# Patient Record
Sex: FEMALE | Race: WHITE | NOT HISPANIC OR LATINO | Employment: FULL TIME | URBAN - METROPOLITAN AREA
[De-identification: names, ages, dates, MRNs, and addresses within clinical notes are randomized per-mention and may not be internally consistent; named-entity substitution may affect disease eponyms.]

---

## 2023-03-19 ENCOUNTER — HOSPITAL ENCOUNTER (OUTPATIENT)
Facility: HOSPITAL | Age: 33
Discharge: HOME | End: 2023-03-22
Attending: STUDENT IN AN ORGANIZED HEALTH CARE EDUCATION/TRAINING PROGRAM | Admitting: SURGERY
Payer: COMMERCIAL

## 2023-03-19 ENCOUNTER — APPOINTMENT (OUTPATIENT)
Dept: MRI IMAGING | Facility: HOSPITAL | Age: 33
End: 2023-03-19
Payer: COMMERCIAL

## 2023-03-19 DIAGNOSIS — E03.9 HYPOTHYROIDISM, UNSPECIFIED TYPE: ICD-10-CM

## 2023-03-19 DIAGNOSIS — K80.50 CHOLEDOCHOLITHIASIS: Primary | ICD-10-CM

## 2023-03-19 DIAGNOSIS — I10 ESSENTIAL HYPERTENSION: ICD-10-CM

## 2023-03-19 DIAGNOSIS — E80.6 HYPERBILIRUBINEMIA: ICD-10-CM

## 2023-03-19 DIAGNOSIS — K81.9 CHOLECYSTITIS: ICD-10-CM

## 2023-03-19 DIAGNOSIS — K80.43 CHOLEDOCHOLITHIASIS WITH ACUTE CHOLECYSTITIS WITH OBSTRUCTION: ICD-10-CM

## 2023-03-19 DIAGNOSIS — R10.13 EPIGASTRIC PAIN: ICD-10-CM

## 2023-03-19 DIAGNOSIS — R74.01 TRANSAMINITIS: ICD-10-CM

## 2023-03-19 LAB
ALBUMIN SERPL-MCNC: 4.4 G/DL (ref 3.5–5.2)
ALBUMIN/GLOB SERPL: 1.3 G/DL
ALP SERPL-CCNC: 113 U/L (ref 39–117)
ALT SERPL W P-5'-P-CCNC: 623 U/L (ref 1–33)
ANION GAP SERPL CALCULATED.3IONS-SCNC: 14 MMOL/L (ref 5–15)
AST SERPL-CCNC: 724 U/L (ref 1–32)
B-HCG UR QL: NEGATIVE
BACTERIA UR QL AUTO: ABNORMAL /HPF
BASOPHILS # BLD AUTO: 0.07 10*3/MM3 (ref 0–0.2)
BASOPHILS NFR BLD AUTO: 1 % (ref 0–1.5)
BILIRUB SERPL-MCNC: 2.7 MG/DL (ref 0–1.2)
BILIRUB UR QL STRIP: ABNORMAL
BUN SERPL-MCNC: 10 MG/DL (ref 6–20)
BUN/CREAT SERPL: 16.1 (ref 7–25)
CALCIUM SPEC-SCNC: 9.1 MG/DL (ref 8.6–10.5)
CHLORIDE SERPL-SCNC: 102 MMOL/L (ref 98–107)
CLARITY UR: ABNORMAL
CO2 SERPL-SCNC: 24 MMOL/L (ref 22–29)
COLOR UR: ABNORMAL
CREAT SERPL-MCNC: 0.62 MG/DL (ref 0.57–1)
D-LACTATE SERPL-SCNC: 0.7 MMOL/L (ref 0.5–2)
DEPRECATED RDW RBC AUTO: 39.5 FL (ref 37–54)
EGFRCR SERPLBLD CKD-EPI 2021: 123 ML/MIN/1.73
EOSINOPHIL # BLD AUTO: 0.09 10*3/MM3 (ref 0–0.4)
EOSINOPHIL NFR BLD AUTO: 1.3 % (ref 0.3–6.2)
ERYTHROCYTE [DISTWIDTH] IN BLOOD BY AUTOMATED COUNT: 12.2 % (ref 12.3–15.4)
GLOBULIN UR ELPH-MCNC: 3.3 GM/DL
GLUCOSE SERPL-MCNC: 110 MG/DL (ref 65–99)
GLUCOSE UR STRIP-MCNC: NEGATIVE MG/DL
HAV IGM SERPL QL IA: NORMAL
HBV CORE IGM SERPL QL IA: NORMAL
HBV SURFACE AG SERPL QL IA: NORMAL
HCT VFR BLD AUTO: 37.3 % (ref 34–46.6)
HCV AB SER DONR QL: NORMAL
HGB BLD-MCNC: 12.5 G/DL (ref 12–15.9)
HGB UR QL STRIP.AUTO: NEGATIVE
HOLD SPECIMEN: NORMAL
HOLD SPECIMEN: NORMAL
IMM GRANULOCYTES # BLD AUTO: 0.02 10*3/MM3 (ref 0–0.05)
IMM GRANULOCYTES NFR BLD AUTO: 0.3 % (ref 0–0.5)
KETONES UR QL STRIP: ABNORMAL
LEUKOCYTE ESTERASE UR QL STRIP.AUTO: ABNORMAL
LIPASE SERPL-CCNC: 20 U/L (ref 13–60)
LYMPHOCYTES # BLD AUTO: 1.19 10*3/MM3 (ref 0.7–3.1)
LYMPHOCYTES NFR BLD AUTO: 16.9 % (ref 19.6–45.3)
MCH RBC QN AUTO: 29.8 PG (ref 26.6–33)
MCHC RBC AUTO-ENTMCNC: 33.5 G/DL (ref 31.5–35.7)
MCV RBC AUTO: 88.8 FL (ref 79–97)
MONOCYTES # BLD AUTO: 0.46 10*3/MM3 (ref 0.1–0.9)
MONOCYTES NFR BLD AUTO: 6.5 % (ref 5–12)
NEUTROPHILS NFR BLD AUTO: 5.23 10*3/MM3 (ref 1.7–7)
NEUTROPHILS NFR BLD AUTO: 74 % (ref 42.7–76)
NITRITE UR QL STRIP: POSITIVE
NRBC BLD AUTO-RTO: 0 /100 WBC (ref 0–0.2)
PH UR STRIP.AUTO: <=5 [PH] (ref 5–8)
PLATELET # BLD AUTO: 405 10*3/MM3 (ref 140–450)
PMV BLD AUTO: 9.1 FL (ref 6–12)
POTASSIUM SERPL-SCNC: 3.7 MMOL/L (ref 3.5–5.2)
PROT SERPL-MCNC: 7.7 G/DL (ref 6–8.5)
PROT UR QL STRIP: ABNORMAL
RBC # BLD AUTO: 4.2 10*6/MM3 (ref 3.77–5.28)
RBC # UR STRIP: ABNORMAL /HPF
REF LAB TEST METHOD: ABNORMAL
SODIUM SERPL-SCNC: 140 MMOL/L (ref 136–145)
SP GR UR STRIP: 1.03 (ref 1–1.03)
SQUAMOUS #/AREA URNS HPF: ABNORMAL /HPF
UROBILINOGEN UR QL STRIP: ABNORMAL
WBC # UR STRIP: ABNORMAL /HPF
WBC NRBC COR # BLD: 7.06 10*3/MM3 (ref 3.4–10.8)
WHOLE BLOOD HOLD COAG: NORMAL
WHOLE BLOOD HOLD SPECIMEN: NORMAL

## 2023-03-19 PROCEDURE — 99284 EMERGENCY DEPT VISIT MOD MDM: CPT

## 2023-03-19 PROCEDURE — 25010000002 MORPHINE PER 10 MG: Performed by: STUDENT IN AN ORGANIZED HEALTH CARE EDUCATION/TRAINING PROGRAM

## 2023-03-19 PROCEDURE — 81025 URINE PREGNANCY TEST: CPT

## 2023-03-19 PROCEDURE — 96375 TX/PRO/DX INJ NEW DRUG ADDON: CPT

## 2023-03-19 PROCEDURE — 80050 GENERAL HEALTH PANEL: CPT | Performed by: INTERNAL MEDICINE

## 2023-03-19 PROCEDURE — 80074 ACUTE HEPATITIS PANEL: CPT | Performed by: STUDENT IN AN ORGANIZED HEALTH CARE EDUCATION/TRAINING PROGRAM

## 2023-03-19 PROCEDURE — 84145 PROCALCITONIN (PCT): CPT | Performed by: INTERNAL MEDICINE

## 2023-03-19 PROCEDURE — 83605 ASSAY OF LACTIC ACID: CPT | Performed by: STUDENT IN AN ORGANIZED HEALTH CARE EDUCATION/TRAINING PROGRAM

## 2023-03-19 PROCEDURE — 74181 MRI ABDOMEN W/O CONTRAST: CPT

## 2023-03-19 PROCEDURE — 36415 COLL VENOUS BLD VENIPUNCTURE: CPT

## 2023-03-19 PROCEDURE — 87040 BLOOD CULTURE FOR BACTERIA: CPT | Performed by: STUDENT IN AN ORGANIZED HEALTH CARE EDUCATION/TRAINING PROGRAM

## 2023-03-19 PROCEDURE — 81001 URINALYSIS AUTO W/SCOPE: CPT | Performed by: STUDENT IN AN ORGANIZED HEALTH CARE EDUCATION/TRAINING PROGRAM

## 2023-03-19 PROCEDURE — 81001 URINALYSIS AUTO W/SCOPE: CPT

## 2023-03-19 PROCEDURE — 25010000002 ONDANSETRON PER 1 MG: Performed by: STUDENT IN AN ORGANIZED HEALTH CARE EDUCATION/TRAINING PROGRAM

## 2023-03-19 PROCEDURE — 96361 HYDRATE IV INFUSION ADD-ON: CPT

## 2023-03-19 PROCEDURE — 25010000002 KETOROLAC TROMETHAMINE PER 15 MG: Performed by: STUDENT IN AN ORGANIZED HEALTH CARE EDUCATION/TRAINING PROGRAM

## 2023-03-19 PROCEDURE — 83690 ASSAY OF LIPASE: CPT

## 2023-03-19 PROCEDURE — 87086 URINE CULTURE/COLONY COUNT: CPT | Performed by: NURSE PRACTITIONER

## 2023-03-19 RX ORDER — KETOROLAC TROMETHAMINE 15 MG/ML
15 INJECTION, SOLUTION INTRAMUSCULAR; INTRAVENOUS ONCE
Status: COMPLETED | OUTPATIENT
Start: 2023-03-19 | End: 2023-03-19

## 2023-03-19 RX ORDER — SODIUM CHLORIDE 9 MG/ML
10 INJECTION INTRAVENOUS AS NEEDED
Status: DISCONTINUED | OUTPATIENT
Start: 2023-03-19 | End: 2023-03-22 | Stop reason: HOSPADM

## 2023-03-19 RX ORDER — ONDANSETRON 2 MG/ML
4 INJECTION INTRAMUSCULAR; INTRAVENOUS ONCE
Status: COMPLETED | OUTPATIENT
Start: 2023-03-19 | End: 2023-03-19

## 2023-03-19 RX ORDER — MORPHINE SULFATE 2 MG/ML
2 INJECTION, SOLUTION INTRAMUSCULAR; INTRAVENOUS ONCE
Status: COMPLETED | OUTPATIENT
Start: 2023-03-19 | End: 2023-03-19

## 2023-03-19 RX ADMIN — SODIUM CHLORIDE 10 ML: 9 INJECTION, SOLUTION INTRAVENOUS at 23:08

## 2023-03-19 RX ADMIN — SODIUM CHLORIDE, POTASSIUM CHLORIDE, SODIUM LACTATE AND CALCIUM CHLORIDE 1000 ML: 600; 310; 30; 20 INJECTION, SOLUTION INTRAVENOUS at 23:11

## 2023-03-19 RX ADMIN — ONDANSETRON 4 MG: 2 INJECTION INTRAMUSCULAR; INTRAVENOUS at 23:07

## 2023-03-19 RX ADMIN — MORPHINE SULFATE 2 MG: 2 INJECTION, SOLUTION INTRAMUSCULAR; INTRAVENOUS at 23:07

## 2023-03-19 RX ADMIN — KETOROLAC TROMETHAMINE 15 MG: 15 INJECTION, SOLUTION INTRAMUSCULAR; INTRAVENOUS at 23:07

## 2023-03-20 ENCOUNTER — ANESTHESIA EVENT (OUTPATIENT)
Dept: PERIOP | Facility: HOSPITAL | Age: 33
End: 2023-03-20
Payer: COMMERCIAL

## 2023-03-20 ENCOUNTER — ANESTHESIA EVENT (OUTPATIENT)
Dept: GASTROENTEROLOGY | Facility: HOSPITAL | Age: 33
End: 2023-03-20
Payer: COMMERCIAL

## 2023-03-20 ENCOUNTER — APPOINTMENT (OUTPATIENT)
Dept: ULTRASOUND IMAGING | Facility: HOSPITAL | Age: 33
End: 2023-03-20
Payer: COMMERCIAL

## 2023-03-20 ENCOUNTER — APPOINTMENT (OUTPATIENT)
Dept: GENERAL RADIOLOGY | Facility: HOSPITAL | Age: 33
End: 2023-03-20
Payer: COMMERCIAL

## 2023-03-20 ENCOUNTER — ANESTHESIA (OUTPATIENT)
Dept: GASTROENTEROLOGY | Facility: HOSPITAL | Age: 33
End: 2023-03-20
Payer: COMMERCIAL

## 2023-03-20 PROBLEM — I10 ESSENTIAL HYPERTENSION: Status: ACTIVE | Noted: 2023-03-20

## 2023-03-20 PROBLEM — E03.9 HYPOTHYROIDISM: Status: ACTIVE | Noted: 2023-03-20

## 2023-03-20 PROBLEM — K81.9 CHOLECYSTITIS: Status: ACTIVE | Noted: 2023-03-20

## 2023-03-20 PROBLEM — K80.43 CHOLEDOCHOLITHIASIS WITH ACUTE CHOLECYSTITIS WITH OBSTRUCTION: Status: ACTIVE | Noted: 2023-03-20

## 2023-03-20 LAB
ALBUMIN SERPL-MCNC: 3.8 G/DL (ref 3.5–5.2)
ALBUMIN/GLOB SERPL: 1.5 G/DL
ALP SERPL-CCNC: 106 U/L (ref 39–117)
ALT SERPL W P-5'-P-CCNC: 612 U/L (ref 1–33)
ANION GAP SERPL CALCULATED.3IONS-SCNC: 8 MMOL/L (ref 5–15)
AST SERPL-CCNC: 590 U/L (ref 1–32)
BACTERIA UR QL AUTO: ABNORMAL /HPF
BASOPHILS # BLD AUTO: 0.04 10*3/MM3 (ref 0–0.2)
BASOPHILS NFR BLD AUTO: 0.6 % (ref 0–1.5)
BILIRUB SERPL-MCNC: 2.7 MG/DL (ref 0–1.2)
BILIRUB UR QL STRIP: ABNORMAL
BUN SERPL-MCNC: 9 MG/DL (ref 6–20)
BUN/CREAT SERPL: 14.8 (ref 7–25)
CALCIUM SPEC-SCNC: 8.8 MG/DL (ref 8.6–10.5)
CHLORIDE SERPL-SCNC: 103 MMOL/L (ref 98–107)
CLARITY UR: ABNORMAL
CO2 SERPL-SCNC: 28 MMOL/L (ref 22–29)
COD CRY URNS QL: ABNORMAL /HPF
COLOR UR: ABNORMAL
CREAT SERPL-MCNC: 0.61 MG/DL (ref 0.57–1)
DEPRECATED RDW RBC AUTO: 38.7 FL (ref 37–54)
EGFRCR SERPLBLD CKD-EPI 2021: 123.5 ML/MIN/1.73
EOSINOPHIL # BLD AUTO: 0.08 10*3/MM3 (ref 0–0.4)
EOSINOPHIL NFR BLD AUTO: 1.3 % (ref 0.3–6.2)
ERYTHROCYTE [DISTWIDTH] IN BLOOD BY AUTOMATED COUNT: 12 % (ref 12.3–15.4)
GLOBULIN UR ELPH-MCNC: 2.5 GM/DL
GLUCOSE SERPL-MCNC: 79 MG/DL (ref 65–99)
GLUCOSE UR STRIP-MCNC: NEGATIVE MG/DL
HCT VFR BLD AUTO: 34.4 % (ref 34–46.6)
HGB BLD-MCNC: 11.5 G/DL (ref 12–15.9)
HGB UR QL STRIP.AUTO: NEGATIVE
HOLD SPECIMEN: NORMAL
HYALINE CASTS UR QL AUTO: ABNORMAL /LPF
IMM GRANULOCYTES # BLD AUTO: 0.02 10*3/MM3 (ref 0–0.05)
IMM GRANULOCYTES NFR BLD AUTO: 0.3 % (ref 0–0.5)
KETONES UR QL STRIP: ABNORMAL
LEUKOCYTE ESTERASE UR QL STRIP.AUTO: ABNORMAL
LIPASE SERPL-CCNC: 22 U/L (ref 13–60)
LYMPHOCYTES # BLD AUTO: 1.94 10*3/MM3 (ref 0.7–3.1)
LYMPHOCYTES NFR BLD AUTO: 30.7 % (ref 19.6–45.3)
MAGNESIUM SERPL-MCNC: 2 MG/DL (ref 1.6–2.6)
MCH RBC QN AUTO: 30 PG (ref 26.6–33)
MCHC RBC AUTO-ENTMCNC: 33.4 G/DL (ref 31.5–35.7)
MCV RBC AUTO: 89.8 FL (ref 79–97)
MONOCYTES # BLD AUTO: 0.39 10*3/MM3 (ref 0.1–0.9)
MONOCYTES NFR BLD AUTO: 6.2 % (ref 5–12)
MUCOUS THREADS URNS QL MICRO: ABNORMAL /HPF
NEUTROPHILS NFR BLD AUTO: 3.84 10*3/MM3 (ref 1.7–7)
NEUTROPHILS NFR BLD AUTO: 60.9 % (ref 42.7–76)
NITRITE UR QL STRIP: POSITIVE
NRBC BLD AUTO-RTO: 0 /100 WBC (ref 0–0.2)
PH UR STRIP.AUTO: <=5 [PH] (ref 5–8)
PLATELET # BLD AUTO: 360 10*3/MM3 (ref 140–450)
PMV BLD AUTO: 9.5 FL (ref 6–12)
POTASSIUM SERPL-SCNC: 3.8 MMOL/L (ref 3.5–5.2)
PROCALCITONIN SERPL-MCNC: 0.37 NG/ML (ref 0–0.25)
PROT SERPL-MCNC: 6.3 G/DL (ref 6–8.5)
PROT UR QL STRIP: ABNORMAL
RBC # BLD AUTO: 3.83 10*6/MM3 (ref 3.77–5.28)
RBC # UR STRIP: ABNORMAL /HPF
REF LAB TEST METHOD: ABNORMAL
SODIUM SERPL-SCNC: 139 MMOL/L (ref 136–145)
SP GR UR STRIP: 1.04 (ref 1–1.03)
SQUAMOUS #/AREA URNS HPF: ABNORMAL /HPF
TSH SERPL DL<=0.05 MIU/L-ACNC: 12.11 UIU/ML (ref 0.27–4.2)
UROBILINOGEN UR QL STRIP: ABNORMAL
WBC # UR STRIP: ABNORMAL /HPF
WBC NRBC COR # BLD: 6.31 10*3/MM3 (ref 3.4–10.8)

## 2023-03-20 PROCEDURE — 76705 ECHO EXAM OF ABDOMEN: CPT

## 2023-03-20 PROCEDURE — 99223 1ST HOSP IP/OBS HIGH 75: CPT | Performed by: NURSE PRACTITIONER

## 2023-03-20 PROCEDURE — 25010000002 ONDANSETRON PER 1 MG: Performed by: NURSE ANESTHETIST, CERTIFIED REGISTERED

## 2023-03-20 PROCEDURE — 43274 ERCP DUCT STENT PLACEMENT: CPT | Performed by: INTERNAL MEDICINE

## 2023-03-20 PROCEDURE — 25010000002 PIPERACILLIN SOD-TAZOBACTAM PER 1 G: Performed by: SURGERY

## 2023-03-20 PROCEDURE — 96365 THER/PROPH/DIAG IV INF INIT: CPT

## 2023-03-20 PROCEDURE — G0378 HOSPITAL OBSERVATION PER HR: HCPCS

## 2023-03-20 PROCEDURE — C1769 GUIDE WIRE: HCPCS | Performed by: INTERNAL MEDICINE

## 2023-03-20 PROCEDURE — 43264 ERCP REMOVE DUCT CALCULI: CPT | Performed by: INTERNAL MEDICINE

## 2023-03-20 PROCEDURE — 93010 ELECTROCARDIOGRAM REPORT: CPT | Performed by: INTERNAL MEDICINE

## 2023-03-20 PROCEDURE — 85025 COMPLETE CBC W/AUTO DIFF WBC: CPT | Performed by: NURSE PRACTITIONER

## 2023-03-20 PROCEDURE — 25010000002 PROPOFOL 10 MG/ML EMULSION: Performed by: NURSE ANESTHETIST, CERTIFIED REGISTERED

## 2023-03-20 PROCEDURE — 25010000002 PIPERACILLIN SOD-TAZOBACTAM PER 1 G: Performed by: STUDENT IN AN ORGANIZED HEALTH CARE EDUCATION/TRAINING PROGRAM

## 2023-03-20 PROCEDURE — 96361 HYDRATE IV INFUSION ADD-ON: CPT

## 2023-03-20 PROCEDURE — 25010000002 PIPERACILLIN SOD-TAZOBACTAM PER 1 G: Performed by: INTERNAL MEDICINE

## 2023-03-20 PROCEDURE — 93005 ELECTROCARDIOGRAM TRACING: CPT | Performed by: NURSE PRACTITIONER

## 2023-03-20 PROCEDURE — 83690 ASSAY OF LIPASE: CPT | Performed by: INTERNAL MEDICINE

## 2023-03-20 PROCEDURE — 74330 X-RAY BILE/PANC ENDOSCOPY: CPT

## 2023-03-20 PROCEDURE — C2625 STENT, NON-COR, TEM W/DEL SY: HCPCS | Performed by: INTERNAL MEDICINE

## 2023-03-20 PROCEDURE — 83735 ASSAY OF MAGNESIUM: CPT | Performed by: INTERNAL MEDICINE

## 2023-03-20 PROCEDURE — 25010000002 DEXAMETHASONE PER 1 MG: Performed by: NURSE ANESTHETIST, CERTIFIED REGISTERED

## 2023-03-20 PROCEDURE — 80053 COMPREHEN METABOLIC PANEL: CPT | Performed by: NURSE PRACTITIONER

## 2023-03-20 DEVICE — PANCREATIC STENT KIT
Type: IMPLANTABLE DEVICE | Site: PANCREAS | Status: FUNCTIONAL
Brand: ADVANIX™ PANCREATIC STENT KIT

## 2023-03-20 DEVICE — RX PUSHER
Type: IMPLANTABLE DEVICE | Site: PANCREAS | Status: FUNCTIONAL
Brand: NAVIFLEX™ RX PUSHER

## 2023-03-20 RX ORDER — FAMOTIDINE 20 MG/1
40 TABLET, FILM COATED ORAL DAILY
Status: DISCONTINUED | OUTPATIENT
Start: 2023-03-20 | End: 2023-03-22 | Stop reason: HOSPADM

## 2023-03-20 RX ORDER — SODIUM CHLORIDE 9 MG/ML
40 INJECTION, SOLUTION INTRAVENOUS AS NEEDED
Status: DISCONTINUED | OUTPATIENT
Start: 2023-03-20 | End: 2023-03-22 | Stop reason: HOSPADM

## 2023-03-20 RX ORDER — MORPHINE SULFATE 2 MG/ML
1 INJECTION, SOLUTION INTRAMUSCULAR; INTRAVENOUS
Status: DISCONTINUED | OUTPATIENT
Start: 2023-03-20 | End: 2023-03-20

## 2023-03-20 RX ORDER — DEXAMETHASONE SODIUM PHOSPHATE 4 MG/ML
INJECTION, SOLUTION INTRA-ARTICULAR; INTRALESIONAL; INTRAMUSCULAR; INTRAVENOUS; SOFT TISSUE AS NEEDED
Status: DISCONTINUED | OUTPATIENT
Start: 2023-03-20 | End: 2023-03-20 | Stop reason: SURG

## 2023-03-20 RX ORDER — ROCURONIUM BROMIDE 10 MG/ML
INJECTION, SOLUTION INTRAVENOUS AS NEEDED
Status: DISCONTINUED | OUTPATIENT
Start: 2023-03-20 | End: 2023-03-20 | Stop reason: SURG

## 2023-03-20 RX ORDER — PROPOFOL 10 MG/ML
VIAL (ML) INTRAVENOUS AS NEEDED
Status: DISCONTINUED | OUTPATIENT
Start: 2023-03-20 | End: 2023-03-20 | Stop reason: SURG

## 2023-03-20 RX ORDER — LIDOCAINE HYDROCHLORIDE 10 MG/ML
0.5 INJECTION, SOLUTION EPIDURAL; INFILTRATION; INTRACAUDAL; PERINEURAL ONCE AS NEEDED
Status: DISCONTINUED | OUTPATIENT
Start: 2023-03-20 | End: 2023-03-20 | Stop reason: HOSPADM

## 2023-03-20 RX ORDER — SODIUM CHLORIDE 9 MG/ML
40 INJECTION, SOLUTION INTRAVENOUS AS NEEDED
Status: CANCELLED | OUTPATIENT
Start: 2023-03-20

## 2023-03-20 RX ORDER — SODIUM CHLORIDE, SODIUM LACTATE, POTASSIUM CHLORIDE, CALCIUM CHLORIDE 600; 310; 30; 20 MG/100ML; MG/100ML; MG/100ML; MG/100ML
30 INJECTION, SOLUTION INTRAVENOUS CONTINUOUS PRN
Status: DISCONTINUED | OUTPATIENT
Start: 2023-03-20 | End: 2023-03-22 | Stop reason: HOSPADM

## 2023-03-20 RX ORDER — ONDANSETRON 2 MG/ML
INJECTION INTRAMUSCULAR; INTRAVENOUS AS NEEDED
Status: DISCONTINUED | OUTPATIENT
Start: 2023-03-20 | End: 2023-03-20 | Stop reason: SURG

## 2023-03-20 RX ORDER — SODIUM CHLORIDE 0.9 % (FLUSH) 0.9 %
10 SYRINGE (ML) INJECTION AS NEEDED
Status: DISCONTINUED | OUTPATIENT
Start: 2023-03-20 | End: 2023-03-22 | Stop reason: HOSPADM

## 2023-03-20 RX ORDER — FAMOTIDINE 40 MG/1
40 TABLET, FILM COATED ORAL DAILY
COMMUNITY

## 2023-03-20 RX ORDER — FAMOTIDINE 20 MG/1
20 TABLET, FILM COATED ORAL ONCE
Status: CANCELLED | OUTPATIENT
Start: 2023-03-20 | End: 2023-03-20

## 2023-03-20 RX ORDER — SODIUM CHLORIDE, SODIUM LACTATE, POTASSIUM CHLORIDE, CALCIUM CHLORIDE 600; 310; 30; 20 MG/100ML; MG/100ML; MG/100ML; MG/100ML
9 INJECTION, SOLUTION INTRAVENOUS CONTINUOUS
Status: DISCONTINUED | OUTPATIENT
Start: 2023-03-20 | End: 2023-03-21 | Stop reason: SDUPTHER

## 2023-03-20 RX ORDER — LEVOTHYROXINE SODIUM 175 UG/1
175 TABLET ORAL DAILY
COMMUNITY

## 2023-03-20 RX ORDER — FENOFIBRATE 145 MG/1
145 TABLET, COATED ORAL DAILY
Status: DISCONTINUED | OUTPATIENT
Start: 2023-03-20 | End: 2023-03-22 | Stop reason: HOSPADM

## 2023-03-20 RX ORDER — SUCCINYLCHOLINE/SOD CL,ISO/PF 200MG/10ML
SYRINGE (ML) INTRAVENOUS AS NEEDED
Status: DISCONTINUED | OUTPATIENT
Start: 2023-03-20 | End: 2023-03-20 | Stop reason: SURG

## 2023-03-20 RX ORDER — SODIUM CHLORIDE, SODIUM LACTATE, POTASSIUM CHLORIDE, CALCIUM CHLORIDE 600; 310; 30; 20 MG/100ML; MG/100ML; MG/100ML; MG/100ML
125 INJECTION, SOLUTION INTRAVENOUS CONTINUOUS
Status: DISCONTINUED | OUTPATIENT
Start: 2023-03-20 | End: 2023-03-21

## 2023-03-20 RX ORDER — FAMOTIDINE 10 MG/ML
20 INJECTION, SOLUTION INTRAVENOUS ONCE
Status: DISCONTINUED | OUTPATIENT
Start: 2023-03-20 | End: 2023-03-20 | Stop reason: HOSPADM

## 2023-03-20 RX ORDER — NALOXONE HCL 0.4 MG/ML
0.4 VIAL (ML) INJECTION
Status: DISCONTINUED | OUTPATIENT
Start: 2023-03-20 | End: 2023-03-22 | Stop reason: HOSPADM

## 2023-03-20 RX ORDER — SODIUM CHLORIDE 0.9 % (FLUSH) 0.9 %
10 SYRINGE (ML) INJECTION AS NEEDED
Status: CANCELLED | OUTPATIENT
Start: 2023-03-20

## 2023-03-20 RX ORDER — ONDANSETRON 2 MG/ML
4 INJECTION INTRAMUSCULAR; INTRAVENOUS EVERY 6 HOURS PRN
Status: DISCONTINUED | OUTPATIENT
Start: 2023-03-20 | End: 2023-03-22 | Stop reason: HOSPADM

## 2023-03-20 RX ORDER — SODIUM CHLORIDE 9 MG/ML
40 INJECTION, SOLUTION INTRAVENOUS AS NEEDED
Status: DISCONTINUED | OUTPATIENT
Start: 2023-03-20 | End: 2023-03-20 | Stop reason: HOSPADM

## 2023-03-20 RX ORDER — ESMOLOL HYDROCHLORIDE 10 MG/ML
INJECTION INTRAVENOUS AS NEEDED
Status: DISCONTINUED | OUTPATIENT
Start: 2023-03-20 | End: 2023-03-20 | Stop reason: SURG

## 2023-03-20 RX ORDER — FAMOTIDINE 10 MG/ML
20 INJECTION, SOLUTION INTRAVENOUS ONCE
Status: CANCELLED | OUTPATIENT
Start: 2023-03-20 | End: 2023-03-20

## 2023-03-20 RX ORDER — MORPHINE SULFATE 2 MG/ML
2 INJECTION, SOLUTION INTRAMUSCULAR; INTRAVENOUS
Status: DISCONTINUED | OUTPATIENT
Start: 2023-03-20 | End: 2023-03-22 | Stop reason: HOSPADM

## 2023-03-20 RX ORDER — FAMOTIDINE 20 MG/1
20 TABLET, FILM COATED ORAL ONCE
Status: DISCONTINUED | OUTPATIENT
Start: 2023-03-20 | End: 2023-03-20 | Stop reason: HOSPADM

## 2023-03-20 RX ORDER — NALOXONE HCL 0.4 MG/ML
0.4 VIAL (ML) INJECTION
Status: DISCONTINUED | OUTPATIENT
Start: 2023-03-20 | End: 2023-03-20

## 2023-03-20 RX ORDER — LIDOCAINE HYDROCHLORIDE 10 MG/ML
INJECTION, SOLUTION EPIDURAL; INFILTRATION; INTRACAUDAL; PERINEURAL AS NEEDED
Status: DISCONTINUED | OUTPATIENT
Start: 2023-03-20 | End: 2023-03-20 | Stop reason: SURG

## 2023-03-20 RX ORDER — SODIUM CHLORIDE 0.9 % (FLUSH) 0.9 %
10 SYRINGE (ML) INJECTION AS NEEDED
Status: DISCONTINUED | OUTPATIENT
Start: 2023-03-20 | End: 2023-03-20 | Stop reason: HOSPADM

## 2023-03-20 RX ORDER — FENOFIBRATE 145 MG/1
145 TABLET, COATED ORAL DAILY
COMMUNITY

## 2023-03-20 RX ORDER — LEVOTHYROXINE SODIUM 175 UG/1
175 TABLET ORAL
Status: DISCONTINUED | OUTPATIENT
Start: 2023-03-20 | End: 2023-03-22 | Stop reason: HOSPADM

## 2023-03-20 RX ORDER — SODIUM CHLORIDE 0.9 % (FLUSH) 0.9 %
10 SYRINGE (ML) INJECTION EVERY 12 HOURS SCHEDULED
Status: DISCONTINUED | OUTPATIENT
Start: 2023-03-20 | End: 2023-03-22 | Stop reason: HOSPADM

## 2023-03-20 RX ORDER — SODIUM CHLORIDE 0.9 % (FLUSH) 0.9 %
10 SYRINGE (ML) INJECTION EVERY 12 HOURS SCHEDULED
Status: DISCONTINUED | OUTPATIENT
Start: 2023-03-20 | End: 2023-03-20 | Stop reason: HOSPADM

## 2023-03-20 RX ADMIN — LEVOTHYROXINE SODIUM 175 MCG: 175 TABLET ORAL at 04:46

## 2023-03-20 RX ADMIN — Medication 120 MG: at 11:08

## 2023-03-20 RX ADMIN — ESMOLOL HYDROCHLORIDE 10 MG: 10 INJECTION, SOLUTION INTRAVENOUS at 11:11

## 2023-03-20 RX ADMIN — TAZOBACTAM SODIUM AND PIPERACILLIN SODIUM 3.38 G: 375; 3 INJECTION, SOLUTION INTRAVENOUS at 01:18

## 2023-03-20 RX ADMIN — DEXAMETHASONE SODIUM PHOSPHATE 8 MG: 4 INJECTION, SOLUTION INTRAMUSCULAR; INTRAVENOUS at 11:13

## 2023-03-20 RX ADMIN — FAMOTIDINE 40 MG: 20 TABLET ORAL at 08:53

## 2023-03-20 RX ADMIN — ONDANSETRON 4 MG: 2 INJECTION INTRAMUSCULAR; INTRAVENOUS at 11:54

## 2023-03-20 RX ADMIN — PROPOFOL 200 MG: 10 INJECTION, EMULSION INTRAVENOUS at 11:08

## 2023-03-20 RX ADMIN — FENOFIBRATE 145 MG: 145 TABLET ORAL at 08:54

## 2023-03-20 RX ADMIN — TAZOBACTAM SODIUM AND PIPERACILLIN SODIUM 4.5 G: 500; 4 INJECTION, SOLUTION INTRAVENOUS at 22:43

## 2023-03-20 RX ADMIN — LIDOCAINE HYDROCHLORIDE 100 MG: 10 INJECTION, SOLUTION EPIDURAL; INFILTRATION; INTRACAUDAL; PERINEURAL at 11:08

## 2023-03-20 RX ADMIN — ROCURONIUM BROMIDE 30 MG: 10 INJECTION INTRAVENOUS at 11:15

## 2023-03-20 RX ADMIN — TAZOBACTAM SODIUM AND PIPERACILLIN SODIUM 4.5 G: 500; 4 INJECTION, SOLUTION INTRAVENOUS at 17:10

## 2023-03-20 RX ADMIN — SODIUM CHLORIDE, POTASSIUM CHLORIDE, SODIUM LACTATE AND CALCIUM CHLORIDE 125 ML/HR: 600; 310; 30; 20 INJECTION, SOLUTION INTRAVENOUS at 01:56

## 2023-03-20 RX ADMIN — ESMOLOL HYDROCHLORIDE 20 MG: 10 INJECTION, SOLUTION INTRAVENOUS at 12:11

## 2023-03-20 RX ADMIN — ROCURONIUM BROMIDE 10 MG: 10 INJECTION INTRAVENOUS at 11:08

## 2023-03-20 RX ADMIN — SODIUM CHLORIDE, POTASSIUM CHLORIDE, SODIUM LACTATE AND CALCIUM CHLORIDE: 600; 310; 30; 20 INJECTION, SOLUTION INTRAVENOUS at 11:06

## 2023-03-20 RX ADMIN — ROCURONIUM BROMIDE 10 MG: 10 INJECTION INTRAVENOUS at 11:42

## 2023-03-20 NOTE — CONSULTS
[facility] Consult    Referring Provider: No ref. provider found    PCP: Reina Hernandez MD    Reason for Consultation: Choledocholithiasis    Chief complaint: Right upper quadrant pain, N/V    History of present illness:    Marcella Mary is a 32 y.o. female who is admitted with a 2-week onset of worsening epigastric and upper quadrant abdominal pain with associated nausea and vomiting.  Patient reports she has been having intermittent symptoms of dyspepsia and epigastric discomfort for some time now but that it markedly worsened over the past 2 weeks; sought care at an outside facility and was told she had GERD and given PPI with no relief.  Notes that her symptoms greatly escalated over the past few days prompting her presentation to ER.  Does not endorse any change in bowel habits, shortness of breath, chest pain, or fever/chills.  Reports symptoms exacerbated by p.o. intake; alleviated by pain control measures.  At time of admission, imaging obtained concerning for acute cholecystitis and choledocholithiasis with associated biliary obstruction; LFTs elevated in cholestatic fashion.  No documents of NSAIDs and is not anticoagulated. Past medical, surgical, social, and family histories are reviewed for accuracy.  No documented alleviating or exacerbating factors.  Does not endorse pain at time of exam.    Allergies:  Patient has no known allergies.    Scheduled Meds:  famotidine, 40 mg, Oral, Daily  fenofibrate, 145 mg, Oral, Daily  levothyroxine, 175 mcg, Oral, Q AM  piperacillin-tazobactam, 4.5 g, Intravenous, Q8H  sodium chloride, 10 mL, Intravenous, Q12H         Infusions:  lactated ringers, 125 mL/hr, Last Rate: 125 mL/hr (03/20/23 0156)        PRN Meds:  •  Magnesium Low Dose Replacement - Initiate Nurse / BPA Driven Protocol  •  Morphine **AND** naloxone  •  ondansetron  •  Sodium Chloride (PF)  •  sodium chloride  •  sodium chloride    Home Meds:  Medications Prior to Admission   Medication Sig  Dispense Refill Last Dose   • famotidine (PEPCID) 40 MG tablet Take 1 tablet by mouth Daily.   3/19/2023 at 0800   • fenofibrate (TRICOR) 145 MG tablet Take 1 tablet by mouth Daily.   3/19/2023 at 0800   • levothyroxine (SYNTHROID, LEVOTHROID) 175 MCG tablet Take 1 tablet by mouth Daily.   3/19/2023 at 0800       ROS: Review of Systems   Constitutional: Positive for activity change and appetite change. Negative for chills, diaphoresis, fatigue, fever and unexpected weight change.   HENT: Negative for sore throat, trouble swallowing and voice change.    Eyes: Negative.    Respiratory: Negative for apnea, cough, choking, chest tightness, shortness of breath, wheezing and stridor.    Cardiovascular: Negative for chest pain, palpitations and leg swelling.   Gastrointestinal: Positive for abdominal distention, abdominal pain, nausea and vomiting. Negative for anal bleeding, blood in stool, constipation, diarrhea and rectal pain.   Endocrine: Negative.    Genitourinary: Negative.    Musculoskeletal: Negative.    Skin: Negative.    Allergic/Immunologic: Negative.    Neurological: Negative.    Hematological: Negative.    Psychiatric/Behavioral: Negative.    All other systems reviewed and are negative.      PAST MED HX:  Past Medical History:   Diagnosis Date   • Asthma    • Disease of thyroid gland    • Hypertension    • Vitamin D deficiency        PAST SURG HX:  Past Surgical History:   Procedure Laterality Date   •  SECTION     • THYROIDECTOMY     • TUBAL ABDOMINAL LIGATION         FAM HX:  Family History   Problem Relation Age of Onset   • Hypertension Father    • Hyperlipidemia Father    • Diabetes Father    • Alzheimer's disease Father    • Hypertension Brother    • Cancer Other        SOC HX:  Social History     Socioeconomic History   • Marital status:    Tobacco Use   • Smoking status: Never     Passive exposure: Never   • Smokeless tobacco: Never   Vaping Use   • Vaping Use: Never used   Substance  "and Sexual Activity   • Alcohol use: Never   • Drug use: Never   • Sexual activity: Defer       PHYSICAL EXAM  /67 (BP Location: Left arm, Patient Position: Lying)   Pulse 65   Temp 98.1 °F (36.7 °C) (Oral)   Resp 16   Ht 165.1 cm (65\")   Wt 111 kg (245 lb)   LMP 03/10/2023 (Approximate)   SpO2 91%   BMI 40.77 kg/m²   Wt Readings from Last 3 Encounters:   03/20/23 111 kg (245 lb)   ,body mass index is 40.77 kg/m².  Physical Exam  Vitals and nursing note reviewed.   Constitutional:       General: She is not in acute distress.     Appearance: Normal appearance. She is obese. She is not ill-appearing or toxic-appearing.   HENT:      Head: Normocephalic and atraumatic.   Eyes:      General: No scleral icterus.     Extraocular Movements: Extraocular movements intact.      Conjunctiva/sclera: Conjunctivae normal.      Pupils: Pupils are equal, round, and reactive to light.   Cardiovascular:      Rate and Rhythm: Normal rate and regular rhythm.      Pulses: Normal pulses.      Heart sounds: Normal heart sounds.   Pulmonary:      Effort: Pulmonary effort is normal. No respiratory distress.      Breath sounds: Normal breath sounds.   Abdominal:      General: Abdomen is flat. Bowel sounds are normal. There is no distension.      Palpations: Abdomen is soft. There is no mass.      Tenderness: There is abdominal tenderness. There is no guarding or rebound.      Hernia: No hernia is present.   Genitourinary:     Comments: Defer  Musculoskeletal:         General: Normal range of motion.      Cervical back: Normal range of motion and neck supple. No rigidity or tenderness.      Right lower leg: No edema.      Left lower leg: No edema.   Lymphadenopathy:      Cervical: No cervical adenopathy.   Skin:     General: Skin is warm and dry.      Capillary Refill: Capillary refill takes less than 2 seconds.      Coloration: Skin is pale. Skin is not jaundiced.   Neurological:      General: No focal deficit present.      " Mental Status: She is alert and oriented to person, place, and time.   Psychiatric:         Mood and Affect: Mood normal.         Behavior: Behavior normal.         Thought Content: Thought content normal.         Judgment: Judgment normal.         Results Review:   I reviewed the patient's new clinical results.  I reviewed the patient's new imaging results and agree with the interpretation.  I personally viewed and interpreted the patient's EKG/Telemetry data    Lab Results   Component Value Date    WBC 6.31 03/20/2023    HGB 11.5 (L) 03/20/2023    HGB 12.5 03/19/2023    HGB 12.8 01/31/2018    HCT 34.4 03/20/2023    MCV 89.8 03/20/2023     03/20/2023       No results found for: INR    Lab Results   Component Value Date    GLUCOSE 79 03/20/2023    BUN 9 03/20/2023    CREATININE 0.61 03/20/2023    BCR 14.8 03/20/2023     03/20/2023    K 3.8 03/20/2023    CO2 28.0 03/20/2023    CALCIUM 8.8 03/20/2023    ALBUMIN 3.8 03/20/2023    ALKPHOS 106 03/20/2023    BILITOT 2.7 (H) 03/20/2023     (H) 03/20/2023     (H) 03/20/2023       US Gallbladder    Result Date: 3/20/2023  EXAMINATION: LIMITED ABDOMINAL ULTRASOUND  DATE OF EXAMINATION: 3/20/2023. COMPARISON: None available. INDICATION: Severe abdominal pain. TECHNIQUE: Grey scale sonographic images of the abdomen were performed. FINDINGS: Pancreas: The visualized pancreas is within normal limits. Liver: The liver is diffusely heterogeneous and increased in echogenicity compatible with fatty liver infiltration.. The common bile duct measures 12 mm.  Gallbladder: The gallbladder wall appears to be heterogeneous and thickened measuring up to 9 mm in diameter. There is a shadowing gallstone within the gallbladder as this raises the suspicion of cholecystitis. Sonographic Jiang sign was also noted to be positive. Right Kidney: The right kidney measures 10.4 x 5.2 x 6.5 cm.  There is no hydronephrosis, shadowing stone, or definite mass.  Additional  significant findings:  None.      1.  Cholelithiasis with positive sonographic Jiang sign and thickening of the gallbladder wall would raise the suspicion of cholecystitis. 2.  Dilation of the common bile duct which is incompletely evaluated on this examination. 3.  Diffuse hepatic steatosis. 4. No evidence of right-sided hydronephrosis.  Electronically signed by:  Dale Allen D.O.  3/19/2023 11:47 PM Mountain Time    MRI abdomen wo contrast mrcp    Result Date: 3/20/2023  EXAMINATION: MRI ABDOMEN WO CONTRAST MRCP DATE: 3/19/2023 11:34 PM INDICATION:  RUQ pain, cholelithiasis, acute transaminitis/hyperbilirubinemia TECHNIQUE: Multiplanar multisequence MRI of the abdomen was performed without and with  the intravenous administration of contrast.  3D MRCP with MIP reconstructions.  Contrast: None. COMPARISON: None Prior. FINDINGS: LOWER CHEST: Unremarkable. LIVER: Signal loss on out of phase imaging suggests fatty liver. No mass lesion identified. Portal and hepatic veins appear patent. BILE DUCTS: Mild intrahepatic biliary dilatation. Moderate extrahepatic biliary dilatation, common bile duct measuring up to 1.4 cm.  Few common bile duct stones, largest 9 mm stone in the distal common bile duct (series 2/image 26) GALLBLADDER: Marked gallbladder wall thickening to 1.1 cm. Sludge and stones in the gallbladder. Evidence of stones in the cystic duct. SPLEEN: Normal. PANCREAS: Mildly prominent main pancreatic duct 3 mm. ADRENALS: Normal. KIDNEYS: No hydronephrosis. NODES: No adenopathy. BOWEL: Normal.  ASCITES:  None.     1.  Evidence of cholecystitis. 2.  Cholelithiasis, probable sludge. 3.  Choledocholithiasis, largest 9 mm stone in the distal common bile duct. 4.  Evidence of fatty liver Electronically signed by:  Kristine Jimenez M.D.  3/19/2023 10:20 PM Mountain Time    No results found for: COVID19    ASSESSMENTS/PLANS  1.  Acute cholecystitis with choledocholithiasis and biliary obstruction  2.  Biliary  obstruction, cholestatic LFTs, related to above  3.  Acute right upper quadrant abdominal pain, related to above  4.  Non-intractable nausea and vomiting, related to above    Marcella Hampshire is a 32 y.o. female presented to hospital with cute onset of right upper quadrant abdominal pain associated nausea and vomiting; symptoms have been occurring intermittently over the past few months with dyspepsia and epigastric discomfort but greatly escalated over the past 2 weeks.  Abdominal imaging reviewed and shows marked choledocholithiasis and biliary obstruction with acute cholecystitis.  Recommend ERCP today to alleviate biliary obstruction; noted plans for cholecystectomy tomorrow.    >>> N.p.o.  >>> Obtain informed consent for endoscopic retrograde cholangiopancreatography  >>> Risk and benefits explained including incomplete cannulation, 10%; Perforation, 1/ 500; Bleeding, 1/500:  Infection; Pancreatitis, 5 to 10% mild to moderate; and 5% severe with 1/1000 risk of death.  >>> Is currently on Zosyn which provides adequate antimicrobial coverage for planned procedure  >>> Order placed for preprocedural indomethacin and intraprocedural fluoroscopy  >>> Continue antiemetics and pain control measures  >>> GI prophylaxis with IV PPI    I discussed the patient's findings and my recommendations with patient, family and nursing staff.    Juan Carlos Yang, GENE  03/20/23  09:30 EDT

## 2023-03-20 NOTE — H&P
[facility] Medicine Services  HISTORY AND PHYSICAL    Patient Name: Marcella Mary  : 1990  MRN: [id number]  Primary Care Physician: Reina Hernandez MD  Date of admission: 3/19/2023    Subjective   Subjective     Chief Complaint:  Abdominal pain    HPI:  Marcella Mary is a 32 y.o. female with a history of asthma, hypothyroidism, HTN, presents to the ED with complaints of epigastric abdominal pain.  Patient reports that she developed epigastric abdominal pain that radiates through to her back approximately 2 weeks ago.  Her pain waxes and wanes with associated nausea and vomiting.  She was seen at [facility] last week where an ultrasound showed gallstones.  She was told that they would set her up with surgery here at [facility] but she has not heard from anyone yet.  Over the weekend her pain has gotten worse which prompted her to come to the ED here at [facility] for evaluation.  She states that she does experience some shortness of air that she feels is due to her increased pain.  She also endorses nausea, vomiting, dysuria, urinary frequency, urinary urgency, and mild dizziness.  No fevers, chills, cough, chest pain, constipation, diarrhea, or any other complaints at this time.  Pertinent labs include , , total bilirubin 2.7.  MRCP shows evidence of cholecystitis, cholelithiasis probable sludge, choledocholithiasis largest 9 mm stone in the distal common bile duct.  Evidence of fatty liver.  Patient was given a liter of LR and started on Zosyn in the ED.  Patient is being admitted to the hospital medicine service for further evaluation and management.        Review of Systems   Constitutional: Negative.    HENT: Negative.    Eyes: Negative.    Respiratory: Positive for shortness of breath.    Cardiovascular: Negative.    Gastrointestinal: Positive for abdominal pain, nausea and vomiting. Negative for constipation and diarrhea.   Endocrine: Negative.    Genitourinary:  Positive for dysuria, frequency and urgency.   Musculoskeletal: Positive for back pain. Negative for myalgias, neck pain and neck stiffness.   Skin: Negative.    Allergic/Immunologic: Negative.    Neurological: Positive for dizziness. Negative for weakness, light-headedness, numbness and headaches.   Hematological: Negative.    Psychiatric/Behavioral: Negative.             Personal History     Past Medical History:   Diagnosis Date   • Asthma    • Disease of thyroid gland    • Hypertension    • Vitamin D deficiency              Past Surgical History:   Procedure Laterality Date   •  SECTION     • THYROIDECTOMY     • TUBAL ABDOMINAL LIGATION         Family History:  family history includes Alzheimer's disease in her father; Cancer in an other family member; Diabetes in her father; Hyperlipidemia in her father; Hypertension in her brother and father.     Social History:  reports that she has never smoked. She has never been exposed to tobacco smoke. She has never used smokeless tobacco. She reports that she does not drink alcohol and does not use drugs.  Social History     Social History Narrative   • Not on file       Medications:  famotidine, fenofibrate, and levothyroxine    No Known Allergies    Objective   Objective     Vital Signs:   Temp:  [98.2 °F (36.8 °C)-98.3 °F (36.8 °C)] 98.3 °F (36.8 °C)  Heart Rate:  [68-77] 71  Resp:  [18] 18  BP: (129-142)/() 142/98    Physical Exam   Constitutional: Awake, alert, resting in bed, family at bedside  Eyes: PERRLA, sclerae anicteric, no conjunctival injection  HENT: NCAT, mucous membranes moist  Neck: Supple, no thyromegaly, no lymphadenopathy, trachea midline  Respiratory: Clear to auscultation bilaterally, nonlabored respirations   Cardiovascular: RRR, no murmurs, rubs, or gallops, palpable pedal pulses bilaterally  Gastrointestinal: Positive bowel sounds, soft, nontender, nondistended  Musculoskeletal: No bilateral ankle edema, no clubbing or cyanosis to  extremities  Psychiatric: Appropriate affect, cooperative  Neurologic: Oriented x 3, strength symmetric in all extremities, Cranial Nerves grossly intact to confrontation, speech clear  Skin: No rashes       Result Review:  I have personally reviewed the results from the time of this admission to 3/20/2023 03:40 EDT and agree with these findings:  [x]  Laboratory list / accordion  []  Microbiology  [x]  Radiology  []  EKG/Telemetry   []  Cardiology/Vascular   []  Pathology  [x]  Old records  []  Other:  Most notable findings include:     LAB RESULTS:      Lab 03/19/23  2154   WBC 7.06   HEMOGLOBIN 12.5   HEMATOCRIT 37.3   PLATELETS 405   NEUTROS ABS 5.23   IMMATURE GRANS (ABS) 0.02   LYMPHS ABS 1.19   MONOS ABS 0.46   EOS ABS 0.09   MCV 88.8   PROCALCITONIN 0.37*   LACTATE 0.7         Lab 03/19/23 2154   SODIUM 140   POTASSIUM 3.7   CHLORIDE 102   CO2 24.0   ANION GAP 14.0   BUN 10   CREATININE 0.62   EGFR 123.0   GLUCOSE 110*   CALCIUM 9.1         Lab 03/19/23 2154   TOTAL PROTEIN 7.7   ALBUMIN 4.4   GLOBULIN 3.3   ALT (SGPT) 623*   AST (SGOT) 724*   BILIRUBIN 2.7*   ALK PHOS 113   LIPASE 20                     Brief Urine Lab Results  (Last result in the past 365 days)      Color   Clarity   Blood   Leuk Est   Nitrite   Protein   CREAT   Urine HCG        03/19/23 2326 Dark Yellow   Cloudy   Negative   Small (1+)   Positive   Trace               Microbiology Results (last 10 days)     ** No results found for the last 240 hours. **          US Gallbladder    Result Date: 3/20/2023  EXAMINATION: LIMITED ABDOMINAL ULTRASOUND  DATE OF EXAMINATION: 3/20/2023. COMPARISON: None available. INDICATION: Severe abdominal pain. TECHNIQUE: Grey scale sonographic images of the abdomen were performed. FINDINGS: Pancreas: The visualized pancreas is within normal limits. Liver: The liver is diffusely heterogeneous and increased in echogenicity compatible with fatty liver infiltration.. The common bile duct measures 12 mm.   Gallbladder: The gallbladder wall appears to be heterogeneous and thickened measuring up to 9 mm in diameter. There is a shadowing gallstone within the gallbladder as this raises the suspicion of cholecystitis. Sonographic Jiang sign was also noted to be positive. Right Kidney: The right kidney measures 10.4 x 5.2 x 6.5 cm.  There is no hydronephrosis, shadowing stone, or definite mass.  Additional significant findings:  None.      Impression: 1.  Cholelithiasis with positive sonographic Jiang sign and thickening of the gallbladder wall would raise the suspicion of cholecystitis. 2.  Dilation of the common bile duct which is incompletely evaluated on this examination. 3.  Diffuse hepatic steatosis. 4. No evidence of right-sided hydronephrosis.  Electronically signed by:  Dale Allen D.O.  3/19/2023 11:47 PM Mountain Time    MRI abdomen wo contrast mrcp    Result Date: 3/20/2023  EXAMINATION: MRI ABDOMEN WO CONTRAST MRCP DATE: 3/19/2023 11:34 PM INDICATION:  RUQ pain, cholelithiasis, acute transaminitis/hyperbilirubinemia TECHNIQUE: Multiplanar multisequence MRI of the abdomen was performed without and with  the intravenous administration of contrast.  3D MRCP with MIP reconstructions.  Contrast: None. COMPARISON: None Prior. FINDINGS: LOWER CHEST: Unremarkable. LIVER: Signal loss on out of phase imaging suggests fatty liver. No mass lesion identified. Portal and hepatic veins appear patent. BILE DUCTS: Mild intrahepatic biliary dilatation. Moderate extrahepatic biliary dilatation, common bile duct measuring up to 1.4 cm.  Few common bile duct stones, largest 9 mm stone in the distal common bile duct (series 2/image 26) GALLBLADDER: Marked gallbladder wall thickening to 1.1 cm. Sludge and stones in the gallbladder. Evidence of stones in the cystic duct. SPLEEN: Normal. PANCREAS: Mildly prominent main pancreatic duct 3 mm. ADRENALS: Normal. KIDNEYS: No hydronephrosis. NODES: No adenopathy. BOWEL: Normal.   ASCITES:  None.     Impression: 1.  Evidence of cholecystitis. 2.  Cholelithiasis, probable sludge. 3.  Choledocholithiasis, largest 9 mm stone in the distal common bile duct. 4.  Evidence of fatty liver Electronically signed by:  Kristine Jimenez M.D.  3/19/2023 10:20 PM Mountain Time          Assessment & Plan   Assessment & Plan       Choledocholithiasis with acute cholecystitis with obstruction    Essential hypertension    Hypothyroidism    Marcella Mary is a 32 y.o. female with a history of asthma, hypothyroidism, HTN, presents to the ED with complaints of epigastric abdominal pain.      Assessment and Plan:    Choledocholithiasis with acute cholecystitis with obstruction  Elevated LFTs  --MRCP shows evidence of cholecystitis, cholelithiasis probable sludge, choledocholithiasis largest 9 mm stone in the distal common bile duct, evidence of fatty liver  -- , , total bilirubin 2.7, hepatitis panel nonreactive  -- Zosyn  -- IV fluids  -- Consult GI for ERCP  -- Consult general surgery  -- Pain control  -- N.p.o.  -- US gallbladder pending  -- A.m. labs    Acute UTI  -- UA: Color dark yellow, appearance cloudy, specific gravity 1.035, ketones trace, nitrite positive, leukocytes small, protein trace, bilirubin moderate, RBC 6-12, bacteria 1+, squamous epithelial 21-30  -- Urine culture  -- zosyn    HTN  -- stable    Hypothyroidism  History of asthma      DVT prophylaxis:  SCDS    CODE STATUS:    Level Of Support Discussed With: Patient  Code Status (Patient has no pulse and is not breathing): CPR (Attempt to Resuscitate)  Medical Interventions (Patient has pulse or is breathing): Full Support      Expected Discharge  Expected Discharge Date and Time     Expected Discharge Date Expected Discharge Time    Mar 22, 2023             This note has been completed as part of a split-shared workflow.     Signature: Electronically signed by GENE Moreno, 03/20/23, 1:30 AM EDT.     Total APC time  spent: 55 minutes    Total attending time spent: 25 minutes  Time spent includes time reviewing chart, face-to-face time, counseling patient/family/caregiver, ordering medications/tests/procedures, communicating with other health care professionals, documenting clinical information in the electronic health record, and coordination of care.       Attending   Admission Attestation       I have performed an independent face-to-face diagnostic evaluation including performing an independent physical examination as documented here.  The documented plan of care above was reviewed and developed with the advanced practice clinician (APC).      Brief Summary Statement:   Marcella Mary is a 32 y.o. female with a PMH significant for asthma, hypothyroidism, hypertension who comes to the ED due to epigastric abdominal pain.  Patient reports onset of epigastric abdominal pain last Monday.  She was seen at an outside ED diagnosed with GERD.  On Tuesday she was evaluated by her PCP who ordered an ultrasound of the gallbladder.  She was notified on Wednesday that she had gallstones and would be contacted with a general surgery referral.  On Friday she had recurrence of pain with decreased appetite, nausea, vomiting.  She says pain has been so severe that she has had a difficult time sleeping at night.  Due to failure to improve, she came to the ED for further evaluation.    Remainder of detailed HPI is as noted by APC and has been reviewed and/or edited by me for completeness.    Attending Physical Exam:  Temp:  [98.2 °F (36.8 °C)-98.3 °F (36.8 °C)] 98.3 °F (36.8 °C)  Heart Rate:  [68-77] 71  Resp:  [18] 18  BP: (129-142)/() 142/98    Constitutional: Awake, alert  Eyes: PERRLA, sclerae anicteric, no conjunctival injection  HENT: NCAT, mucous membranes moist  Neck: Supple, no thyromegaly, no lymphadenopathy, trachea midline  Respiratory: Clear to auscultation bilaterally, nonlabored respirations   Cardiovascular: RRR, no murmurs,  rubs, or gallops, palpable pedal pulses bilaterally  Gastrointestinal: Positive bowel sounds, soft, nontender, nondistended  Musculoskeletal: No bilateral ankle edema, no clubbing or cyanosis to extremities  Psychiatric: Appropriate affect, cooperative  Neurologic: Oriented x 3, strength symmetric in all extremities, Cranial Nerves grossly intact to confrontation, speech clear  Skin: No rashes      Brief Assessment/Plan :  See detailed assessment and plan developed with APC which I have reviewed and/or edited for completeness.            Monika Blackman,   03/20/23

## 2023-03-20 NOTE — PROGRESS NOTES
2nd admission today. Admitted by partner earlier today    Acute cholecystitis  Choledocholithiasis w/ elevated bilirubin and transaminases  Dyslipidemia (on tricor)  Hypothyroidism (on synthroid)    Plan:  -continue iv fluids, zosyn  -gi taking for ercp today  -Dr. LINARES tentatively planning ccy tomorrow    Am labs: ordered

## 2023-03-20 NOTE — PLAN OF CARE
Goal Outcome Evaluation:  Plan of Care Reviewed With: patient        Progress: no change  Outcome Evaluation: Patient admitted from the ED at 0235. VSS on RA. No complaints of pain or nausea since arrival to the unit. Complaints of abd pressure. EKG completed. Plan for General Surgery and GI consults today.  at bedside. IVF and IV abx infusing. Will continue with plan of care.

## 2023-03-20 NOTE — PLAN OF CARE
Goal Outcome Evaluation:  Plan of Care Reviewed With: patient        Progress: improving  Outcome Evaluation: VSS on RA. Complaints of soreness and mild pressure but requested no intervention. No complaints of nausea. ERCP completed today. Anticipate marlnea in the AM. Discussed plan of care with patient who verbalizes understanding.

## 2023-03-20 NOTE — BRIEF OP NOTE
ENDOSCOPIC RETROGRADE CHOLANGIOPANCREATOGRAPHY  Progress Note    Marcella Staplehurst  3/20/2023    First wire pass entered the pancreas duct.  A 4 Czech by 3 cm single pigtail stent was prophylactically placed in the pancreas duct.  Wire cannulation of the common bile duct was achieved.  Cholangiogram revealed filling defect in the distal common bile duct, and a mildly dilated common bile duct.  Sphincterotomy performed without bleeding.  Balloon sweeps yielded a large speckled stone and 2 smaller stones.  A moderate amount of sludge was swept from the duct.  Bile flowed freely at conclusion of procedure.    >> KUB in 1 to 2 weeks to assess for spontaneous passage of prophylactic pancreas duct stent.  If stent remains, will need removal by EGD.  >> Proceed with cholecystectomy.    Mark I. Brunner, MD     Date: 3/20/2023  Time: 12:00 EDT

## 2023-03-20 NOTE — ANESTHESIA PREPROCEDURE EVALUATION
Anesthesia Evaluation     Patient summary reviewed and Nursing notes reviewed   NPO Solid Status: > 8 hours  NPO Liquid Status: > 2 hours           Airway   Mallampati: II  TM distance: >3 FB  Neck ROM: full  No difficulty expected  Dental      Pulmonary    (-) asthma ( Childhood ), shortness of breath, recent URI, sleep apnea, not a smoker  Cardiovascular     ECG reviewed    (+) hypertension,   (-) past MI, dysrhythmias, angina, cardiac stents    ROS comment: ECG SR SA   ECHO 61%.  Mildly elevated AV gradient with normal AV      mean gradient 12 mmHg and peak> 22   YOSHI to r/o  membranous subaortic stenosis           Neuro/Psych  (-) seizures, CVA  GI/Hepatic/Renal/Endo    (+) obesity,   thyroid problem hypothyroidism  (-) no renal disease, diabetes    Musculoskeletal     Abdominal    Substance History      OB/GYN          Other        ROS/Med Hx Other: HCT 34   Asymptomatic for AS                Anesthesia Plan    ASA 3     general   Rapid sequence  (RSI CP ETT   Aim for MAP >65 may have mild AS )  intravenous induction     Anesthetic plan, risks, benefits, and alternatives have been provided, discussed and informed consent has been obtained with: patient.    Plan discussed with CRNA.        CODE STATUS:    Level Of Support Discussed With: Patient  Code Status (Patient has no pulse and is not breathing): CPR (Attempt to Resuscitate)  Medical Interventions (Patient has pulse or is breathing): Full Support

## 2023-03-20 NOTE — CASE MANAGEMENT/SOCIAL WORK
Discharge Planning Assessment  [facility]     Patient Name: Marcella Mary  MRN: [id number]  Today's Date: 3/20/2023    Admit Date: 3/19/2023    Plan: CM eval   Discharge Needs Assessment    No documentation.                Discharge Plan     Row Name 03/20/23 1521       Plan    Plan CM eval    Plan Comments Confirmed with patient that she lives in [facility] with her spouse. She is independent of ADLs and does not use any DME at home. PCP confirmed to be Reina Hernandez. Plans for surgery tomorrow. No dc needs identified at this time- CM will continue to follow- [place] [phone]    Final Discharge Disposition Code 01 - home or self-care              Continued Care and Services - Admitted Since 3/19/2023    Coordination has not been started for this encounter.       Expected Discharge Date and Time     Expected Discharge Date Expected Discharge Time    Mar 22, 2023          Demographic Summary    No documentation.                Functional Status    No documentation.                Psychosocial    No documentation.                Abuse/Neglect    No documentation.                Legal    No documentation.                Substance Abuse    No documentation.                Patient Forms    No documentation.                   [place] Rogers RN

## 2023-03-20 NOTE — ANESTHESIA POSTPROCEDURE EVALUATION
Patient: Marcella Mary    Procedure Summary     Date: 03/20/23 Room / Location: [facility] ENDOSCOPY 3 /  [facility]    Anesthesia Start: 1106 Anesthesia Stop: 1212    Procedure: ENDOSCOPIC RETROGRADE CHOLANGIOPANCREATOGRAPHY Diagnosis:     Surgeons: Brunner, Mark I, MD Provider: Neto Unger MD    Anesthesia Type: general ASA Status: 3          Anesthesia Type: general    Vitals  Vitals Value Taken Time   /102 03/20/23 1210   Temp 97.3 °F (36.3 °C) 03/20/23 1207   Pulse 80 03/20/23 1212   Resp     SpO2 96 % 03/20/23 1212   Vitals shown include unvalidated device data.        Post Anesthesia Care and Evaluation    Patient location during evaluation: PACU  Patient participation: complete - patient participated  Level of consciousness: awake and alert  Pain management: adequate    Airway patency: patent  Anesthetic complications: No anesthetic complications  PONV Status: none  Cardiovascular status: hemodynamically stable and acceptable  Respiratory status: nonlabored ventilation, acceptable and room air  Hydration status: acceptable    Comments: /111 gave 20mg of esmolol. Reassessed at 1217 /99  HR 90  Sats 96% on RA

## 2023-03-20 NOTE — CONSULTS
General Surgery Consultation Note    Date of Service: 3/20/2023  Marcella Mary  [id number]  1990      Referring Provider: John Augustin MD    Location of Consult: 2F     Reason for Consultation: Choledocholithiasis       History of Present Illness:  I am seeing, Marcella Mary, in consultation for John Augustin MD regarding choledocholithiasis.  Patient is a 32-year-old lady with history of hyperlipidemia, Graves' disease and obesity.  She presented emergency room with worsening abdominal pain, nausea and vomiting.  Apparently she started having problems 2 weeks ago.  She was seen at [facility] emergency room and had an ultrasound this past week that showed cholelithiasis.  With worsening pain, she presented emergency room for further evaluation.  Work-up was remarkable for elevated bilirubin at 2.7 and gallbladder ultrasound remarkable for cholelithiasis with concern about cholecystitis and a dilated common bile duct at 12 mm.  MRCP is remarkable for markedly thickened gallbladder wall at 11 mm with sludge and stones in the gallbladder and a common bile duct of 14 mm.  A 9 mm gallstone is noted in the distal common bile duct.  Patient admits feeling better this morning.  Abdominal surgeries include a tubal ligation and .     Problems Addressed this Visit    None  Diagnoses    None.         Past Medical History:   Diagnosis Date   • Asthma    • Disease of thyroid gland    • Hypertension    • Vitamin D deficiency        Past Surgical History:   •  SECTION   • THYROIDECTOMY   • TUBAL ABDOMINAL LIGATION       No Known Allergies    No current facility-administered medications on file prior to encounter.     Current Outpatient Medications on File Prior to Encounter   Medication Sig Dispense Refill   • famotidine (PEPCID) 40 MG tablet Take 1 tablet by mouth Daily.     • fenofibrate (TRICOR) 145 MG tablet Take 1 tablet by mouth Daily.     • levothyroxine (SYNTHROID, LEVOTHROID) 175 MCG  tablet Take 1 tablet by mouth Daily.           Current Facility-Administered Medications:   •  famotidine (PEPCID) tablet 40 mg, 40 mg, Oral, Daily, Zoe Blackmansie G, DO  •  fenofibrate (TRICOR) tablet 145 mg, 145 mg, Oral, Daily, Yehuda, Monika G, DO  •  lactated ringers infusion, 125 mL/hr, Intravenous, Continuous, Betzaida Duarte APRN, Last Rate: 125 mL/hr at 03/20/23 0156, 125 mL/hr at 03/20/23 0156  •  levothyroxine (SYNTHROID, LEVOTHROID) tablet 175 mcg, 175 mcg, Oral, Q AM, Zoe Blackmansie G, DO, 175 mcg at 03/20/23 0446  •  Magnesium Low Dose Replacement - Initiate Nurse / BPA Driven Protocol, , Does not apply, PRN, John Augustin MD  •  morphine injection 2 mg, 2 mg, Intravenous, Q3H PRN **AND** naloxone (NARCAN) injection 0.4 mg, 0.4 mg, Intravenous, Q5 Min PRN, Monika Blackman G, DO  •  ondansetron (ZOFRAN) injection 4 mg, 4 mg, Intravenous, Q6H PRN, Betzaida Duarte APRN  •  piperacillin-tazobactam (ZOSYN) 4.5 g in iso-osmotic dextrose 100 mL IVPB (premix), 4.5 g, Intravenous, Q8H, Allen Sparks IV, PharmD  •  Sodium Chloride (PF) 0.9 % 10 mL, 10 mL, Intravenous, PRN, Emergency, Triage Protocol, MD, 10 mL at 03/19/23 2308  •  sodium chloride 0.9 % flush 10 mL, 10 mL, Intravenous, Q12H, Betzaida Duarte, APRN  •  sodium chloride 0.9 % flush 10 mL, 10 mL, Intravenous, PRN, Betzaida Duarte, APRN  •  sodium chloride 0.9 % infusion 40 mL, 40 mL, Intravenous, PRN, Betzaida Duarte, APRN    Family History   Problem Relation Age of Onset   • Hypertension Father    • Hyperlipidemia Father    • Diabetes Father    • Alzheimer's disease Father    • Hypertension Brother    • Cancer Other      Social History     Socioeconomic History   • Marital status:    Tobacco Use   • Smoking status: Never     Passive exposure: Never   • Smokeless tobacco: Never   Vaping Use   • Vaping Use: Never used   Substance and Sexual Activity   • Alcohol use: Never   • Drug use: Never   • Sexual activity:  "Defer       Review of Systems:  Review of Systems   As above  Otherwise the 12 point review of systems is negative.    /67 (BP Location: Left arm, Patient Position: Lying)   Pulse 65   Temp 98.1 °F (36.7 °C) (Oral)   Resp 16   Ht 165.1 cm (65\")   Wt 111 kg (245 lb)   LMP 03/10/2023 (Approximate)   SpO2 91%   BMI 40.77 kg/m²   Body mass index is 40.77 kg/m².    General: Alert and oriented x3 in no acute distress  HEENT: PER, positive icterus, normal sclerae  Cardiac: regular rhythm,  no audible rubs  Pulmonary: bilateral breath sounds, nonlabored  Abdominal: Obese and soft with no guarding  Neurologic: awake, alert, no obvious focal deficits  Extremities: warm, no edema  Skin: no obvious rashes nor worrisome lesions seen     CBC  Results from last 7 days   Lab Units 03/20/23  0654   WBC 10*3/mm3 6.31   HEMOGLOBIN g/dL 11.5*   HEMATOCRIT % 34.4   PLATELETS 10*3/mm3 360       CMP  Results from last 7 days   Lab Units 03/20/23  0654   SODIUM mmol/L 139   POTASSIUM mmol/L 3.8   CHLORIDE mmol/L 103   CO2 mmol/L 28.0   BUN mg/dL 9   CREATININE mg/dL 0.61   CALCIUM mg/dL 8.8   BILIRUBIN mg/dL 2.7*   ALK PHOS U/L 106   ALT (SGPT) U/L 612*   AST (SGOT) U/L 590*   GLUCOSE mg/dL 79       Radiology  Imaging Results (Last 72 Hours)     Procedure Component Value Units Date/Time    US Gallbladder [[id number]] Collected: 03/19/23 2345     Updated: 03/20/23 0148    Narrative:      EXAMINATION: LIMITED ABDOMINAL ULTRASOUND      DATE OF EXAMINATION: 3/20/2023.    COMPARISON: None available.    INDICATION: Severe abdominal pain.    TECHNIQUE: Grey scale sonographic images of the abdomen were performed.    FINDINGS:    Pancreas: The visualized pancreas is within normal limits.    Liver: The liver is diffusely heterogeneous and increased in echogenicity compatible with fatty liver infiltration.. The common bile duct measures 12 mm.     Gallbladder: The gallbladder wall appears to be heterogeneous and thickened measuring up to " 9 mm in diameter. There is a shadowing gallstone within the gallbladder as this raises the suspicion of cholecystitis. Sonographic Jiang sign was also noted to   be positive.    Right Kidney: The right kidney measures 10.4 x 5.2 x 6.5 cm.  There is no hydronephrosis, shadowing stone, or definite mass.       Additional significant findings:  None.       Impression:      1.  Cholelithiasis with positive sonographic Jiang sign and thickening of the gallbladder wall would raise the suspicion of cholecystitis.  2.  Dilation of the common bile duct which is incompletely evaluated on this examination.  3.  Diffuse hepatic steatosis.  4. No evidence of right-sided hydronephrosis.           Electronically signed by:  Dale Allen D.O.    3/19/2023 11:47 PM Mountain Time    MRI abdomen wo contrast mrcp [[id number]] Collected: 03/19/23 2212     Updated: 03/20/23 0021    Narrative:      EXAMINATION: MRI ABDOMEN WO CONTRAST MRCP  DATE: 3/19/2023 11:34 PM    INDICATION:  RUQ pain, cholelithiasis, acute transaminitis/hyperbilirubinemia    TECHNIQUE: Multiplanar multisequence MRI of the abdomen was performed without and with  the intravenous administration of contrast.  3D MRCP with MIP reconstructions.  Contrast: None.    COMPARISON: None Prior.    FINDINGS:    LOWER CHEST: Unremarkable.    LIVER: Signal loss on out of phase imaging suggests fatty liver. No mass lesion identified. Portal and hepatic veins appear patent.    BILE DUCTS: Mild intrahepatic biliary dilatation. Moderate extrahepatic biliary dilatation, common bile duct measuring up to 1.4 cm.  Few common bile duct stones, largest 9 mm stone in the distal common bile duct (series 2/image 26)    GALLBLADDER: Marked gallbladder wall thickening to 1.1 cm. Sludge and stones in the gallbladder. Evidence of stones in the cystic duct.    SPLEEN: Normal.    PANCREAS: Mildly prominent main pancreatic duct 3 mm.     ADRENALS: Normal.    KIDNEYS: No hydronephrosis.    NODES: No  adenopathy.    BOWEL: Normal.      ASCITES:  None.          Impression:      1.  Evidence of cholecystitis.  2.  Cholelithiasis, probable sludge.  3.  Choledocholithiasis, largest 9 mm stone in the distal common bile duct.  4.  Evidence of fatty liver    Electronically signed by:  Kristine Jimenez M.D.    3/19/2023 10:20 PM Mountain Time            Assessment:  Mrs. Mary is a pleasant 32-year-old lady who presents with worsening abdominal pain, nausea and vomiting that started 2 weeks ago.  Work-up is remarkable for elevated liver function tests with a bilirubin of 2.7.  Gallbladder ultrasound and MRCP are concerning for thickened gallbladder wall with cholelithiasis and choledocholithiasis with a dilated common bile duct at 14 mm.  Patient has been clinically stable.    Plan:  I reviewed her studies and discussed the findings with the patient and her .  Patient will need an ERCP.  GI service has been consulted.  Plan to proceed with laparoscopic cholecystectomy tomorrow.  The risks of anesthesia, infection, bleeding, possible common bile duct, liver as well as bowel injury and open cholecystectomy were discussed.  She understands and is willing to proceed with the surgery.  In the meantime continue with IV hydration and antibiotics.    Thank you for this consultation.      Sukhdeep Crook MD  03/20/23  08:30 EDT

## 2023-03-20 NOTE — ANESTHESIA PROCEDURE NOTES
Airway  Urgency: elective    Date/Time: 3/20/2023 11:09 AM    General Information and Staff    Patient location during procedure: OR  CRNA/CAA: Nita Li CRNA    Indications and Patient Condition  Indications for airway management: airway protection    Preoxygenated: yes  MILS maintained throughout  Mask difficulty assessment: 0 - not attempted    Final Airway Details  Final airway type: endotracheal airway      Successful airway: ETT  Cuffed: yes   Successful intubation technique: direct laryngoscopy and RSI  Facilitating devices/methods: intubating stylet  Endotracheal tube insertion site: oral  Blade: Nadia  Blade size: 4  ETT size (mm): 7.5  Cormack-Lehane Classification: grade IIa - partial view of glottis  Placement verified by: chest auscultation and capnometry   Measured from: lips  ETT/EBT  to lips (cm): 23  Number of attempts at approach: 1  Assessment: lips, teeth, and gum same as pre-op and atraumatic intubation    Additional Comments  Negative epigastric sounds, symmetrical chest rise and fall, +BBS, +ETCO2, dentition and lips unchanged; head and neck neutral

## 2023-03-21 ENCOUNTER — ANESTHESIA (OUTPATIENT)
Dept: PERIOP | Facility: HOSPITAL | Age: 33
End: 2023-03-21
Payer: COMMERCIAL

## 2023-03-21 LAB
ALBUMIN SERPL-MCNC: 3.9 G/DL (ref 3.5–5.2)
ALBUMIN/GLOB SERPL: 1.6 G/DL
ALP SERPL-CCNC: 108 U/L (ref 39–117)
ALT SERPL W P-5'-P-CCNC: 532 U/L (ref 1–33)
ANION GAP SERPL CALCULATED.3IONS-SCNC: 13 MMOL/L (ref 5–15)
AST SERPL-CCNC: 260 U/L (ref 1–32)
B-HCG UR QL: NEGATIVE
BACTERIA SPEC AEROBE CULT: NORMAL
BILIRUB SERPL-MCNC: 1 MG/DL (ref 0–1.2)
BUN SERPL-MCNC: 9 MG/DL (ref 6–20)
BUN/CREAT SERPL: 14.1 (ref 7–25)
CALCIUM SPEC-SCNC: 8.7 MG/DL (ref 8.6–10.5)
CHLORIDE SERPL-SCNC: 101 MMOL/L (ref 98–107)
CO2 SERPL-SCNC: 26 MMOL/L (ref 22–29)
CREAT SERPL-MCNC: 0.64 MG/DL (ref 0.57–1)
DEPRECATED RDW RBC AUTO: 40.2 FL (ref 37–54)
EGFRCR SERPLBLD CKD-EPI 2021: 122.1 ML/MIN/1.73
ERYTHROCYTE [DISTWIDTH] IN BLOOD BY AUTOMATED COUNT: 12.4 % (ref 12.3–15.4)
EXPIRATION DATE: NORMAL
GLOBULIN UR ELPH-MCNC: 2.4 GM/DL
GLUCOSE SERPL-MCNC: 91 MG/DL (ref 65–99)
HCT VFR BLD AUTO: 33.7 % (ref 34–46.6)
HGB BLD-MCNC: 11.3 G/DL (ref 12–15.9)
INTERNAL NEGATIVE CONTROL: NORMAL
INTERNAL POSITIVE CONTROL: NORMAL
Lab: NORMAL
MAGNESIUM SERPL-MCNC: 1.9 MG/DL (ref 1.6–2.6)
MCH RBC QN AUTO: 30.2 PG (ref 26.6–33)
MCHC RBC AUTO-ENTMCNC: 33.5 G/DL (ref 31.5–35.7)
MCV RBC AUTO: 90.1 FL (ref 79–97)
PLATELET # BLD AUTO: 388 10*3/MM3 (ref 140–450)
PMV BLD AUTO: 9.5 FL (ref 6–12)
POTASSIUM SERPL-SCNC: 3.9 MMOL/L (ref 3.5–5.2)
PROT SERPL-MCNC: 6.3 G/DL (ref 6–8.5)
RBC # BLD AUTO: 3.74 10*6/MM3 (ref 3.77–5.28)
SODIUM SERPL-SCNC: 140 MMOL/L (ref 136–145)
WBC NRBC COR # BLD: 9.24 10*3/MM3 (ref 3.4–10.8)

## 2023-03-21 PROCEDURE — 25010000002 HYDROMORPHONE 1 MG/ML SOLUTION

## 2023-03-21 PROCEDURE — 25010000002 ONDANSETRON PER 1 MG: Performed by: ANESTHESIOLOGY

## 2023-03-21 PROCEDURE — 80053 COMPREHEN METABOLIC PANEL: CPT | Performed by: INTERNAL MEDICINE

## 2023-03-21 PROCEDURE — G0378 HOSPITAL OBSERVATION PER HR: HCPCS

## 2023-03-21 PROCEDURE — 25010000002 PROPOFOL 10 MG/ML EMULSION: Performed by: ANESTHESIOLOGY

## 2023-03-21 PROCEDURE — 81025 URINE PREGNANCY TEST: CPT | Performed by: ANESTHESIOLOGY

## 2023-03-21 PROCEDURE — 83735 ASSAY OF MAGNESIUM: CPT | Performed by: INTERNAL MEDICINE

## 2023-03-21 PROCEDURE — 25010000002 FENTANYL CITRATE (PF) 100 MCG/2ML SOLUTION: Performed by: ANESTHESIOLOGY

## 2023-03-21 PROCEDURE — 25010000002 PIPERACILLIN SOD-TAZOBACTAM PER 1 G: Performed by: SURGERY

## 2023-03-21 PROCEDURE — 88304 TISSUE EXAM BY PATHOLOGIST: CPT | Performed by: SURGERY

## 2023-03-21 PROCEDURE — 94640 AIRWAY INHALATION TREATMENT: CPT

## 2023-03-21 PROCEDURE — 25010000002 DEXAMETHASONE PER 1 MG: Performed by: ANESTHESIOLOGY

## 2023-03-21 PROCEDURE — 25010000002 MAGNESIUM SULFATE 2 GM/50ML SOLUTION: Performed by: INTERNAL MEDICINE

## 2023-03-21 PROCEDURE — 25010000002 PIPERACILLIN SOD-TAZOBACTAM PER 1 G: Performed by: INTERNAL MEDICINE

## 2023-03-21 PROCEDURE — 99233 SBSQ HOSP IP/OBS HIGH 50: CPT | Performed by: INTERNAL MEDICINE

## 2023-03-21 PROCEDURE — 25010000002 PIPERACILLIN SOD-TAZOBACTAM PER 1 G: Performed by: ANESTHESIOLOGY

## 2023-03-21 PROCEDURE — 25010000002 KETOROLAC TROMETHAMINE PER 15 MG: Performed by: ANESTHESIOLOGY

## 2023-03-21 PROCEDURE — 85027 COMPLETE CBC AUTOMATED: CPT | Performed by: INTERNAL MEDICINE

## 2023-03-21 DEVICE — LIGACLIP 12 MM ENDOSCOPIC ROTATING MULTIPLE CLIP APPLIER (LARGE)
Type: IMPLANTABLE DEVICE | Site: ABDOMEN | Status: FUNCTIONAL
Brand: LIGACLIP

## 2023-03-21 RX ORDER — KETOROLAC TROMETHAMINE 30 MG/ML
INJECTION, SOLUTION INTRAMUSCULAR; INTRAVENOUS AS NEEDED
Status: DISCONTINUED | OUTPATIENT
Start: 2023-03-21 | End: 2023-03-21 | Stop reason: SURG

## 2023-03-21 RX ORDER — DEXAMETHASONE SODIUM PHOSPHATE 4 MG/ML
INJECTION, SOLUTION INTRA-ARTICULAR; INTRALESIONAL; INTRAMUSCULAR; INTRAVENOUS; SOFT TISSUE AS NEEDED
Status: DISCONTINUED | OUTPATIENT
Start: 2023-03-21 | End: 2023-03-21 | Stop reason: SURG

## 2023-03-21 RX ORDER — EPHEDRINE SULFATE 50 MG/ML
5 INJECTION, SOLUTION INTRAVENOUS ONCE AS NEEDED
Status: DISCONTINUED | OUTPATIENT
Start: 2023-03-21 | End: 2023-03-21 | Stop reason: SDUPTHER

## 2023-03-21 RX ORDER — ONDANSETRON 2 MG/ML
4 INJECTION INTRAMUSCULAR; INTRAVENOUS ONCE AS NEEDED
Status: DISCONTINUED | OUTPATIENT
Start: 2023-03-21 | End: 2023-03-21 | Stop reason: HOSPADM

## 2023-03-21 RX ORDER — FENTANYL CITRATE 50 UG/ML
50 INJECTION, SOLUTION INTRAMUSCULAR; INTRAVENOUS
Status: DISCONTINUED | OUTPATIENT
Start: 2023-03-21 | End: 2023-03-21 | Stop reason: HOSPADM

## 2023-03-21 RX ORDER — PROPOFOL 10 MG/ML
VIAL (ML) INTRAVENOUS CONTINUOUS PRN
Status: DISCONTINUED | OUTPATIENT
Start: 2023-03-21 | End: 2023-03-21 | Stop reason: SURG

## 2023-03-21 RX ORDER — BUPIVACAINE HYDROCHLORIDE AND EPINEPHRINE 5; 5 MG/ML; UG/ML
INJECTION, SOLUTION PERINEURAL AS NEEDED
Status: DISCONTINUED | OUTPATIENT
Start: 2023-03-21 | End: 2023-03-21 | Stop reason: HOSPADM

## 2023-03-21 RX ORDER — LIDOCAINE HYDROCHLORIDE 10 MG/ML
0.5 INJECTION, SOLUTION EPIDURAL; INFILTRATION; INTRACAUDAL; PERINEURAL ONCE AS NEEDED
Status: DISCONTINUED | OUTPATIENT
Start: 2023-03-21 | End: 2023-03-21 | Stop reason: HOSPADM

## 2023-03-21 RX ORDER — SODIUM CHLORIDE, SODIUM LACTATE, POTASSIUM CHLORIDE, CALCIUM CHLORIDE 600; 310; 30; 20 MG/100ML; MG/100ML; MG/100ML; MG/100ML
9 INJECTION, SOLUTION INTRAVENOUS CONTINUOUS
Status: DISCONTINUED | OUTPATIENT
Start: 2023-03-21 | End: 2023-03-22 | Stop reason: HOSPADM

## 2023-03-21 RX ORDER — SODIUM CHLORIDE, SODIUM LACTATE, POTASSIUM CHLORIDE, CALCIUM CHLORIDE 600; 310; 30; 20 MG/100ML; MG/100ML; MG/100ML; MG/100ML
100 INJECTION, SOLUTION INTRAVENOUS CONTINUOUS
Status: DISCONTINUED | OUTPATIENT
Start: 2023-03-21 | End: 2023-03-22 | Stop reason: HOSPADM

## 2023-03-21 RX ORDER — HYDROCODONE BITARTRATE AND ACETAMINOPHEN 5; 325 MG/1; MG/1
1 TABLET ORAL EVERY 6 HOURS PRN
Status: DISCONTINUED | OUTPATIENT
Start: 2023-03-21 | End: 2023-03-22 | Stop reason: HOSPADM

## 2023-03-21 RX ORDER — ONDANSETRON 2 MG/ML
INJECTION INTRAMUSCULAR; INTRAVENOUS AS NEEDED
Status: DISCONTINUED | OUTPATIENT
Start: 2023-03-21 | End: 2023-03-21 | Stop reason: SURG

## 2023-03-21 RX ORDER — SODIUM CHLORIDE 9 MG/ML
INJECTION, SOLUTION INTRAVENOUS AS NEEDED
Status: DISCONTINUED | OUTPATIENT
Start: 2023-03-21 | End: 2023-03-21 | Stop reason: HOSPADM

## 2023-03-21 RX ORDER — FENTANYL CITRATE 50 UG/ML
INJECTION, SOLUTION INTRAMUSCULAR; INTRAVENOUS AS NEEDED
Status: DISCONTINUED | OUTPATIENT
Start: 2023-03-21 | End: 2023-03-21 | Stop reason: SURG

## 2023-03-21 RX ORDER — ALBUTEROL SULFATE 2.5 MG/3ML
2.5 SOLUTION RESPIRATORY (INHALATION)
Status: DISCONTINUED | OUTPATIENT
Start: 2023-03-21 | End: 2023-03-22 | Stop reason: HOSPADM

## 2023-03-21 RX ORDER — NALOXONE HCL 0.4 MG/ML
0.4 VIAL (ML) INJECTION AS NEEDED
Status: DISCONTINUED | OUTPATIENT
Start: 2023-03-21 | End: 2023-03-21 | Stop reason: HOSPADM

## 2023-03-21 RX ORDER — SODIUM CHLORIDE 0.9 % (FLUSH) 0.9 %
10 SYRINGE (ML) INJECTION EVERY 12 HOURS SCHEDULED
Status: DISCONTINUED | OUTPATIENT
Start: 2023-03-21 | End: 2023-03-21 | Stop reason: HOSPADM

## 2023-03-21 RX ORDER — GLYCOPYRROLATE 0.2 MG/ML
INJECTION INTRAMUSCULAR; INTRAVENOUS AS NEEDED
Status: DISCONTINUED | OUTPATIENT
Start: 2023-03-21 | End: 2023-03-21 | Stop reason: SURG

## 2023-03-21 RX ORDER — MAGNESIUM HYDROXIDE 1200 MG/15ML
LIQUID ORAL AS NEEDED
Status: DISCONTINUED | OUTPATIENT
Start: 2023-03-21 | End: 2023-03-21 | Stop reason: HOSPADM

## 2023-03-21 RX ORDER — MEPERIDINE HYDROCHLORIDE 25 MG/ML
12.5 INJECTION INTRAMUSCULAR; INTRAVENOUS; SUBCUTANEOUS
Status: DISCONTINUED | OUTPATIENT
Start: 2023-03-21 | End: 2023-03-21 | Stop reason: SDUPTHER

## 2023-03-21 RX ORDER — EPHEDRINE SULFATE 50 MG/ML
5 INJECTION, SOLUTION INTRAVENOUS ONCE AS NEEDED
Status: DISCONTINUED | OUTPATIENT
Start: 2023-03-21 | End: 2023-03-21 | Stop reason: HOSPADM

## 2023-03-21 RX ORDER — MEPERIDINE HYDROCHLORIDE 25 MG/ML
12.5 INJECTION INTRAMUSCULAR; INTRAVENOUS; SUBCUTANEOUS
Status: DISCONTINUED | OUTPATIENT
Start: 2023-03-21 | End: 2023-03-21 | Stop reason: HOSPADM

## 2023-03-21 RX ORDER — ACETAMINOPHEN 500 MG
500 TABLET ORAL 4 TIMES DAILY PRN
Status: DISCONTINUED | OUTPATIENT
Start: 2023-03-21 | End: 2023-03-22 | Stop reason: HOSPADM

## 2023-03-21 RX ORDER — FENTANYL CITRATE 50 UG/ML
50 INJECTION, SOLUTION INTRAMUSCULAR; INTRAVENOUS
Status: DISCONTINUED | OUTPATIENT
Start: 2023-03-21 | End: 2023-03-21 | Stop reason: SDUPTHER

## 2023-03-21 RX ORDER — MIDAZOLAM HYDROCHLORIDE 1 MG/ML
1 INJECTION INTRAMUSCULAR; INTRAVENOUS
Status: DISCONTINUED | OUTPATIENT
Start: 2023-03-21 | End: 2023-03-21 | Stop reason: HOSPADM

## 2023-03-21 RX ORDER — ROCURONIUM BROMIDE 10 MG/ML
INJECTION, SOLUTION INTRAVENOUS AS NEEDED
Status: DISCONTINUED | OUTPATIENT
Start: 2023-03-21 | End: 2023-03-21 | Stop reason: SURG

## 2023-03-21 RX ORDER — LIDOCAINE HYDROCHLORIDE 10 MG/ML
INJECTION, SOLUTION EPIDURAL; INFILTRATION; INTRACAUDAL; PERINEURAL AS NEEDED
Status: DISCONTINUED | OUTPATIENT
Start: 2023-03-21 | End: 2023-03-21 | Stop reason: SURG

## 2023-03-21 RX ORDER — SODIUM CHLORIDE, SODIUM LACTATE, POTASSIUM CHLORIDE, CALCIUM CHLORIDE 600; 310; 30; 20 MG/100ML; MG/100ML; MG/100ML; MG/100ML
100 INJECTION, SOLUTION INTRAVENOUS CONTINUOUS
Status: DISCONTINUED | OUTPATIENT
Start: 2023-03-21 | End: 2023-03-21 | Stop reason: SDUPTHER

## 2023-03-21 RX ORDER — ONDANSETRON 2 MG/ML
4 INJECTION INTRAMUSCULAR; INTRAVENOUS ONCE AS NEEDED
Status: DISCONTINUED | OUTPATIENT
Start: 2023-03-21 | End: 2023-03-21 | Stop reason: SDUPTHER

## 2023-03-21 RX ORDER — MAGNESIUM SULFATE HEPTAHYDRATE 40 MG/ML
2 INJECTION, SOLUTION INTRAVENOUS ONCE
Status: COMPLETED | OUTPATIENT
Start: 2023-03-21 | End: 2023-03-21

## 2023-03-21 RX ADMIN — GLYCOPYRROLATE 0.1 MG: 0.2 INJECTION INTRAMUSCULAR; INTRAVENOUS at 16:31

## 2023-03-21 RX ADMIN — TAZOBACTAM SODIUM AND PIPERACILLIN SODIUM 4.5 G: 500; 4 INJECTION, SOLUTION INTRAVENOUS at 08:14

## 2023-03-21 RX ADMIN — ROCURONIUM BROMIDE 50 MG: 10 INJECTION INTRAVENOUS at 16:08

## 2023-03-21 RX ADMIN — TAZOBACTAM SODIUM AND PIPERACILLIN SODIUM 3.38 G: 375; 3 INJECTION, SOLUTION INTRAVENOUS at 16:21

## 2023-03-21 RX ADMIN — PROPOFOL 150 MG: 10 INJECTION, EMULSION INTRAVENOUS at 16:08

## 2023-03-21 RX ADMIN — DEXAMETHASONE SODIUM PHOSPHATE 8 MG: 4 INJECTION, SOLUTION INTRAMUSCULAR; INTRAVENOUS at 16:15

## 2023-03-21 RX ADMIN — ALBUTEROL SULFATE 2.5 MG: 2.5 SOLUTION RESPIRATORY (INHALATION) at 20:49

## 2023-03-21 RX ADMIN — GLYCOPYRROLATE 0.1 MG: 0.2 INJECTION INTRAMUSCULAR; INTRAVENOUS at 16:29

## 2023-03-21 RX ADMIN — TAZOBACTAM SODIUM AND PIPERACILLIN SODIUM 4.5 G: 500; 4 INJECTION, SOLUTION INTRAVENOUS at 17:58

## 2023-03-21 RX ADMIN — MAGNESIUM SULFATE HEPTAHYDRATE 2 G: 2 INJECTION, SOLUTION INTRAVENOUS at 08:14

## 2023-03-21 RX ADMIN — SODIUM CHLORIDE, POTASSIUM CHLORIDE, SODIUM LACTATE AND CALCIUM CHLORIDE 100 ML/HR: 600; 310; 30; 20 INJECTION, SOLUTION INTRAVENOUS at 17:58

## 2023-03-21 RX ADMIN — PROPOFOL 25 MCG/KG/MIN: 10 INJECTION, EMULSION INTRAVENOUS at 16:15

## 2023-03-21 RX ADMIN — LIDOCAINE HYDROCHLORIDE 50 MG: 10 INJECTION, SOLUTION EPIDURAL; INFILTRATION; INTRACAUDAL; PERINEURAL at 16:08

## 2023-03-21 RX ADMIN — SUGAMMADEX 200 MG: 100 INJECTION, SOLUTION INTRAVENOUS at 17:01

## 2023-03-21 RX ADMIN — FENOFIBRATE 145 MG: 145 TABLET ORAL at 08:13

## 2023-03-21 RX ADMIN — HYDROMORPHONE HYDROCHLORIDE 0.5 MG: 1 INJECTION, SOLUTION INTRAMUSCULAR; INTRAVENOUS; SUBCUTANEOUS at 17:31

## 2023-03-21 RX ADMIN — LEVOTHYROXINE SODIUM 175 MCG: 175 TABLET ORAL at 05:26

## 2023-03-21 RX ADMIN — Medication 10 ML: at 13:12

## 2023-03-21 RX ADMIN — SODIUM CHLORIDE, POTASSIUM CHLORIDE, SODIUM LACTATE AND CALCIUM CHLORIDE 9 ML/HR: 600; 310; 30; 20 INJECTION, SOLUTION INTRAVENOUS at 13:12

## 2023-03-21 RX ADMIN — KETOROLAC TROMETHAMINE 30 MG: 30 INJECTION, SOLUTION INTRAMUSCULAR; INTRAVENOUS at 16:59

## 2023-03-21 RX ADMIN — Medication 0.5 MG: at 17:31

## 2023-03-21 RX ADMIN — FENTANYL CITRATE 50 MCG: 50 INJECTION, SOLUTION INTRAMUSCULAR; INTRAVENOUS at 16:08

## 2023-03-21 RX ADMIN — FAMOTIDINE 40 MG: 20 TABLET ORAL at 08:13

## 2023-03-21 RX ADMIN — FENTANYL CITRATE 50 MCG: 50 INJECTION, SOLUTION INTRAMUSCULAR; INTRAVENOUS at 16:36

## 2023-03-21 RX ADMIN — ONDANSETRON 4 MG: 2 INJECTION INTRAMUSCULAR; INTRAVENOUS at 16:53

## 2023-03-21 NOTE — PLAN OF CARE
Goal Outcome Evaluation:  Plan of Care Reviewed With: patient        Progress: improving   VSS, RA, A/Ox4. PT had lap marlena, lap sites CDI. Currently resting in bed, will continue plan of care.   Problem: Adult Inpatient Plan of Care  Goal: Plan of Care Review  Outcome: Ongoing, Progressing  Flowsheets (Taken 3/21/2023 1919)  Progress: improving  Plan of Care Reviewed With: patient  Goal: Patient-Specific Goal (Individualized)  Outcome: Ongoing, Progressing  Goal: Absence of Hospital-Acquired Illness or Injury  Outcome: Ongoing, Progressing  Intervention: Identify and Manage Fall Risk  Recent Flowsheet Documentation  Taken 3/21/2023 1800 by Maty Rivas RN  Safety Promotion/Fall Prevention:   clutter free environment maintained   assistive device/personal items within reach   activity supervised   fall prevention program maintained   toileting scheduled   room organization consistent   safety round/check completed   nonskid shoes/slippers when out of bed   lighting adjusted  Taken 3/21/2023 1603 by Maty Rivas RN  Safety Promotion/Fall Prevention: patient off unit  Taken 3/21/2023 1400 by Maty Rivas RN  Safety Promotion/Fall Prevention: patient off unit  Taken 3/21/2023 1200 by Maty Rivas RN  Safety Promotion/Fall Prevention:   clutter free environment maintained   assistive device/personal items within reach   activity supervised   fall prevention program maintained   toileting scheduled   safety round/check completed   room organization consistent   nonskid shoes/slippers when out of bed   lighting adjusted  Taken 3/21/2023 1000 by Maty Rivas RN  Safety Promotion/Fall Prevention:   clutter free environment maintained   assistive device/personal items within reach   activity supervised   fall prevention program maintained   room organization consistent   safety round/check completed   toileting scheduled   nonskid shoes/slippers when out of bed   lighting adjusted  Taken 3/21/2023 0800 by Rob  QUE Rutledge  Safety Promotion/Fall Prevention:   clutter free environment maintained   assistive device/personal items within reach   activity supervised   fall prevention program maintained   safety round/check completed   toileting scheduled   room organization consistent   nonskid shoes/slippers when out of bed   lighting adjusted  Intervention: Prevent Skin Injury  Recent Flowsheet Documentation  Taken 3/21/2023 1800 by Maty Rivas RN  Body Position: position changed independently  Taken 3/21/2023 1200 by Maty Rivas RN  Body Position: position changed independently  Taken 3/21/2023 1000 by Maty Rivas RN  Body Position: position changed independently  Taken 3/21/2023 0800 by Maty Rivas RN  Body Position: position changed independently  Intervention: Prevent and Manage VTE (Venous Thromboembolism) Risk  Recent Flowsheet Documentation  Taken 3/21/2023 1800 by Maty Rivas RN  Activity Management: activity adjusted per tolerance  Taken 3/21/2023 1200 by Maty Rivas RN  Activity Management: activity adjusted per tolerance  Taken 3/21/2023 1000 by Maty Rivas RN  Activity Management: activity adjusted per tolerance  Taken 3/21/2023 0800 by Maty Rivas RN  Activity Management: activity adjusted per tolerance  VTE Prevention/Management:   bilateral   sequential compression devices off  Range of Motion: active ROM (range of motion) encouraged  Intervention: Prevent Infection  Recent Flowsheet Documentation  Taken 3/21/2023 1800 by Maty Rivas RN  Infection Prevention:   single patient room provided   rest/sleep promoted   visitors restricted/screened   hand hygiene promoted  Taken 3/21/2023 1200 by Maty Rivas RN  Infection Prevention:   rest/sleep promoted   single patient room provided   visitors restricted/screened   hand hygiene promoted  Taken 3/21/2023 1000 by Maty Rivas RN  Infection Prevention:   visitors restricted/screened   single patient room provided   rest/sleep  promoted   hand hygiene promoted  Taken 3/21/2023 0800 by Maty Rivas RN  Infection Prevention:   single patient room provided   rest/sleep promoted   visitors restricted/screened   hand hygiene promoted  Goal: Optimal Comfort and Wellbeing  Outcome: Ongoing, Progressing  Intervention: Provide Person-Centered Care  Recent Flowsheet Documentation  Taken 3/21/2023 0800 by Maty Rivas RN  Trust Relationship/Rapport:   choices provided   care explained  Goal: Readiness for Transition of Care  Outcome: Ongoing, Progressing     Problem: Hypertension Comorbidity  Goal: Blood Pressure in Desired Range  Outcome: Ongoing, Progressing  Intervention: Maintain Blood Pressure Management  Recent Flowsheet Documentation  Taken 3/21/2023 1800 by Maty Rivas RN  Medication Review/Management: medications reviewed  Taken 3/21/2023 1200 by Maty Rivas RN  Medication Review/Management: medications reviewed  Taken 3/21/2023 1000 by Maty Rivas RN  Medication Review/Management: medications reviewed  Taken 3/21/2023 0800 by Maty Rivas RN  Medication Review/Management: medications reviewed     Problem: Pain Acute  Goal: Acceptable Pain Control and Functional Ability  Outcome: Ongoing, Progressing  Intervention: Prevent or Manage Pain  Recent Flowsheet Documentation  Taken 3/21/2023 1800 by Maty Rivas RN  Medication Review/Management: medications reviewed  Taken 3/21/2023 1200 by Maty Rivas RN  Medication Review/Management: medications reviewed  Taken 3/21/2023 1000 by Maty Rivas RN  Medication Review/Management: medications reviewed  Taken 3/21/2023 0800 by Maty Rivas RN  Medication Review/Management: medications reviewed  Intervention: Optimize Psychosocial Wellbeing  Recent Flowsheet Documentation  Taken 3/21/2023 0800 by Maty Rivas RN  Diversional Activities:   [employer]   television      no fever/no chills

## 2023-03-21 NOTE — PROGRESS NOTES
[facility] Medicine Services  PROGRESS NOTE    Patient Name: Marcella Mary  : 1990  MRN: [id number]    Date of Admission: 3/19/2023  Primary Care Physician: Reina Hernandez MD    Subjective   Subjective     CC:  Cholecystitis, choledocholithiasis    HPI:  No dyspena  No cp  No palpitations      ROS:  No f/c  No palpitations  No dyspnea  No pain currently    Objective   Objective     Vital Signs:   Temp:  [97.8 °F (36.6 °C)-98.9 °F (37.2 °C)] 98.9 °F (37.2 °C)  Heart Rate:  [49-86] 68  Resp:  [16] 16  BP: (103-158)/(64-97) 158/90     Physical Exam:  Constitutional:Alert, oriented x 3, nontoxic appearing  Psych:Normal/appropriate affect  HEENT:NCAT, oropharynx clear  Neck: neck supple, full range of motion  Neuro: Face symmetric, speech clear, equal , moves all extremities  Cardiac: RRR; No pretibial pitting edema  Resp: CTAB, normal effort  GI: abd soft, nontender currently  Skin: No extremity rash  Musculoskeletal/extremities: no cyanosis of extremities; no significant ankle edema      Results Reviewed:  LAB RESULTS:      Lab 23  0526 23  0654 23  2154   WBC 9.24 6.31 7.06   HEMOGLOBIN 11.3* 11.5* 12.5   HEMATOCRIT 33.7* 34.4 37.3   PLATELETS 388 360 405   NEUTROS ABS  --  3.84 5.23   IMMATURE GRANS (ABS)  --  0.02 0.02   LYMPHS ABS  --  1.94 1.19   MONOS ABS  --  0.39 0.46   EOS ABS  --  0.08 0.09   MCV 90.1 89.8 88.8   PROCALCITONIN  --   --  0.37*   LACTATE  --   --  0.7         Lab 23  0526 23  0654 23  2154   SODIUM 140 139 140   POTASSIUM 3.9 3.8 3.7   CHLORIDE 101 103 102   CO2 26.0 28.0 24.0   ANION GAP 13.0 8.0 14.0   BUN 9 9 10   CREATININE 0.64 0.61 0.62   EGFR 122.1 123.5 123.0   GLUCOSE 91 79 110*   CALCIUM 8.7 8.8 9.1   MAGNESIUM 1.9 2.0  --    TSH  --   --  12.110*         Lab 23  0526 23  0654 23  2154   TOTAL PROTEIN 6.3 6.3 7.7   ALBUMIN 3.9 3.8 4.4   GLOBULIN 2.4 2.5 3.3   ALT (SGPT) 532* 612* 623*   AST  (SGOT) 260* 590* 724*   BILIRUBIN 1.0 2.7* 2.7*   ALK PHOS 108 106 113   LIPASE  --  22 20                     Brief Urine Lab Results  (Last result in the past 365 days)      Color   Clarity   Blood   Leuk Est   Nitrite   Protein   CREAT   Urine HCG        03/21/23 1315               Negative             Microbiology Results Abnormal     Procedure Component Value - Date/Time    Urine Culture - Urine, Urine, Clean Catch [[id number]] Collected: 03/19/23 2326    Lab Status: Final result Specimen: Urine, Clean Catch Updated: 03/21/23 0946     Urine Culture 25,000 CFU/mL Normal Urogenital Dena    Narrative:      Colonization of the urinary tract without infection is common. Treatment is discouraged unless the patient is symptomatic, pregnant, or undergoing an invasive urologic procedure.    Blood Culture - Blood, Arm, Right [[id number]]  (Normal) Collected: 03/19/23 2235    Lab Status: Preliminary result Specimen: Blood from Arm, Right Updated: 03/20/23 2315     Blood Culture No growth at 24 hours    Blood Culture - Blood, Arm, Left [[id number]]  (Normal) Collected: 03/19/23 2240    Lab Status: Preliminary result Specimen: Blood from Arm, Left Updated: 03/20/23 2315     Blood Culture No growth at 24 hours          FL ERCP pancreatic and biliary ducts    Result Date: 3/20/2023  FL ERCP PANCREATIC AND BILIARY DUCTS Date of Exam: 3/20/2023 11:02 AM EDT Indication: ERCP. Comparison: None available. Technique:  A series of radiographic digital spot films were obtained in conjunction with a endoscopic catheterization of the biliary and pancreatic ductal system, performed by the gastroenterologist. Fluoroscopic Time: 147.6 seconds Findings: Contrast injection common bile duct demonstrates abrupt cut off of the distal common bile duct suggesting a stone or multiple stones as demonstrated on prior MRCP. No other filling defects identified.     Impression: Impression: 1. Abrupt cut off of the distal common bile duct most likely  related to underlying obstructing stones. Electronically Signed: Luis Enrique De La Cruzens  3/20/2023 1:58 PM EDT  Workstation ID: [id number]    US Gallbladder    Result Date: 3/20/2023  EXAMINATION: LIMITED ABDOMINAL ULTRASOUND  DATE OF EXAMINATION: 3/20/2023. COMPARISON: None available. INDICATION: Severe abdominal pain. TECHNIQUE: Grey scale sonographic images of the abdomen were performed. FINDINGS: Pancreas: The visualized pancreas is within normal limits. Liver: The liver is diffusely heterogeneous and increased in echogenicity compatible with fatty liver infiltration.. The common bile duct measures 12 mm.  Gallbladder: The gallbladder wall appears to be heterogeneous and thickened measuring up to 9 mm in diameter. There is a shadowing gallstone within the gallbladder as this raises the suspicion of cholecystitis. Sonographic Jiang sign was also noted to be positive. Right Kidney: The right kidney measures 10.4 x 5.2 x 6.5 cm.  There is no hydronephrosis, shadowing stone, or definite mass.  Additional significant findings:  None.      Impression: 1.  Cholelithiasis with positive sonographic Jiang sign and thickening of the gallbladder wall would raise the suspicion of cholecystitis. 2.  Dilation of the common bile duct which is incompletely evaluated on this examination. 3.  Diffuse hepatic steatosis. 4. No evidence of right-sided hydronephrosis.  Electronically signed by:  Dale Allen D.O.  3/19/2023 11:47 PM Mountain Time    MRI abdomen wo contrast mrcp    Result Date: 3/20/2023  EXAMINATION: MRI ABDOMEN WO CONTRAST MRCP DATE: 3/19/2023 11:34 PM INDICATION:  RUQ pain, cholelithiasis, acute transaminitis/hyperbilirubinemia TECHNIQUE: Multiplanar multisequence MRI of the abdomen was performed without and with  the intravenous administration of contrast.  3D MRCP with MIP reconstructions.  Contrast: None. COMPARISON: None Prior. FINDINGS: LOWER CHEST: Unremarkable. LIVER: Signal loss on out of phase imaging suggests  fatty liver. No mass lesion identified. Portal and hepatic veins appear patent. BILE DUCTS: Mild intrahepatic biliary dilatation. Moderate extrahepatic biliary dilatation, common bile duct measuring up to 1.4 cm.  Few common bile duct stones, largest 9 mm stone in the distal common bile duct (series 2/image 26) GALLBLADDER: Marked gallbladder wall thickening to 1.1 cm. Sludge and stones in the gallbladder. Evidence of stones in the cystic duct. SPLEEN: Normal. PANCREAS: Mildly prominent main pancreatic duct 3 mm. ADRENALS: Normal. KIDNEYS: No hydronephrosis. NODES: No adenopathy. BOWEL: Normal.  ASCITES:  None.     Impression: 1.  Evidence of cholecystitis. 2.  Cholelithiasis, probable sludge. 3.  Choledocholithiasis, largest 9 mm stone in the distal common bile duct. 4.  Evidence of fatty liver Electronically signed by:  Kristine Jimenez M.D.  3/19/2023 10:20 PM Mountain Time          Current medications:  Scheduled Meds:famotidine, 40 mg, Oral, Daily  [MAR Hold] fenofibrate, 145 mg, Oral, Daily  [MAR Hold] levothyroxine, 175 mcg, Oral, Q AM  piperacillin-tazobactam, 4.5 g, Intravenous, Q8H  [MAR Hold] sodium chloride, 10 mL, Intravenous, Q12H  sodium chloride, 10 mL, Intravenous, Q12H      Continuous Infusions:lactated ringers, 125 mL/hr, Last Rate: 125 mL/hr (03/20/23 0156)  lactated ringers, 9 mL/hr  lactated ringers, 30 mL/hr  lactated ringers, 9 mL/hr, Last Rate: 9 mL/hr (03/21/23 1312)      PRN Meds:.•  lactated ringers  •  lidocaine PF 1%  •  [MAR Hold] Magnesium Low Dose Replacement - Follow Nurse / BPA Driven Protocol  •  midazolam  •  [MAR Hold] Morphine **AND** [MAR Hold] naloxone  •  [MAR Hold] ondansetron  •  [MAR Hold] Sodium Chloride (PF)  •  [MAR Hold] sodium chloride  •  [MAR Hold] sodium chloride    Assessment & Plan   Assessment & Plan     Active Hospital Problems    Diagnosis  POA   • **Choledocholithiasis with acute cholecystitis with obstruction [K80.43]  Yes   • Essential hypertension [I10]   Yes   • Hypothyroidism [E03.9]  Yes   • Cholecystitis [K81.9]  Yes      Resolved Hospital Problems   No resolved problems to display.        Brief Hospital Course to date:  Marcella Mary is a 32 y.o. female w/ hx asthma, hypothyroidism, hl who presented w/ abd pain. Labs revealed elevated transaminases and bilirubin. Ct imaging revealed gb wall thickening. mrcp revealed evidence of cholecystitis and 9mm distal common bile duct stone. Patient was admitted and initiated on empiric zosyn.  GI and general surgery consulted. Underwent ercp w/ stone extraction 3/20/23. Dr. LINARES taking to OR for ccy 3/21/23       Acute cholecystitis  Choledocholithiasis w/ elevated bilirubin and transaminases  -3/21/23: s/p ercp w/ sphincterotomy, balloon sweeps yielded large stone and 2 smaller stones, sludge noted  -Dr. LINARES taking to OR for ccy today; duration of antibiotics per Gen surgery (on empiric zosyn currently)   Dyslipidemia   -on tricor  Hypothyroidism   -synthroid  Hx asthma  -prn albuterol    Am labs: cbc,cmp      Expected Discharge Location and Transportation: home  Expected Discharge ? 3/22/23 if/when ok w/ gen surgery  Expected Discharge Date and Time     Expected Discharge Date Expected Discharge Time    Mar 22, 2023            DVT prophylaxis:  Mechanical DVT prophylaxis orders are present.     AM-PAC 6 Clicks Score (PT): 24 (03/21/23 0800)    CODE STATUS:   Code Status and Medical Interventions:   Ordered at: 03/20/23 0129     Level Of Support Discussed With:    Patient     Code Status (Patient has no pulse and is not breathing):    CPR (Attempt to Resuscitate)     Medical Interventions (Patient has pulse or is breathing):    Full Support       John Augustin MD  03/21/23

## 2023-03-21 NOTE — PROGRESS NOTES
Patient is in OR for cholecystectomy.  Discussed with patient's .  He states her abdominal pain resolved after ERCP yesterday.    >> We will arrange KUB in approximately 1 week to assess for spontaneous passage of prophylactic pancreas duct stent.    Will sign off.  Please call with questions or concerns.

## 2023-03-21 NOTE — ED PROVIDER NOTES
EMERGENCY DEPARTMENT ENCOUNTER    Pt Name: Marcella Mary  MRN: [id number]  Pt :   1990  Room Number:  S212/1  Date of encounter:  3/19/2023  PCP: Reina Hernandez MD  ED Provider: John Del Rio MD    Historian: Patient,       HPI:  Chief Complaint: Abdominal pain        Context: Marcella Mary is a 32-year-old female who presents the emergency department for evaluation of severe right upper quadrant pain that she believes is related to her gallbladder.  She been having the symptoms for a couple of weeks now.  She was seen at an outside hospital and initially told it was acid reflux but her pain persisted so her primary sent her for a gallbladder ultrasound which showed cholelithiasis without cholecystitis.  She was post be scheduled for follow-up with the general surgeon but that has not happened yet and her pain continues to worsen.  She describes a severe colicky right upper quadrant pain that gets worse with eating.  She has been trying to manage her diet but has not been helping.  No appreciated fevers or systemic symptoms.  No other complaints at this time.    PAST MEDICAL HISTORY  Past Medical History:   Diagnosis Date   • Asthma    • Disease of thyroid gland    • Hypertension    • Vitamin D deficiency          PAST SURGICAL HISTORY  Past Surgical History:   Procedure Laterality Date   •  SECTION     • ERCP N/A 3/20/2023    Procedure: ENDOSCOPIC RETROGRADE CHOLANGIOPANCREATOGRAPHY WITH STENT PLACEMENT;  Surgeon: Brunner, Mark I, MD;  Location: [facility];  Service: Gastroenterology;  Laterality: N/A;  sphincterotomy made at ampulla. Balloon sweep of bile duct done with 9-12 balloon.  Pancreatic stent placed in PD.   • THYROIDECTOMY     • TUBAL ABDOMINAL LIGATION           FAMILY HISTORY  Family History   Problem Relation Age of Onset   • Hypertension Father    • Hyperlipidemia Father    • Diabetes Father    • Alzheimer's disease Father    • Hypertension Brother    • Cancer  Other          SOCIAL HISTORY  Social History     Socioeconomic History   • Marital status:    Tobacco Use   • Smoking status: Never     Passive exposure: Never   • Smokeless tobacco: Never   Vaping Use   • Vaping Use: Never used   Substance and Sexual Activity   • Alcohol use: Never   • Drug use: Never   • Sexual activity: Defer         ALLERGIES  Ceclor [cefaclor]        REVIEW OF SYSTEMS  Review of Systems     All systems reviewed and negative except for those discussed in HPI.       PHYSICAL EXAM    I have reviewed the triage vital signs and nursing notes.    ED Triage Vitals [03/19/23 2131]   Temp Heart Rate Resp BP SpO2   98.2 °F (36.8 °C) 72 18 (!) 141/103 98 %      Temp src Heart Rate Source Patient Position BP Location FiO2 (%)   Oral Monitor Sitting Left arm --       Physical Exam  GENERAL:   Appears uncomfortable in mild distress  HENT: Nares patent.  EYES: No scleral icterus.  CV: Regular rhythm, regular rate.  RESPIRATORY: Normal effort.  No audible wheezes, rales or rhonchi.  ABDOMEN: Soft, tenderness in the epigastrium and right upper quadrant without rigidity  MUSCULOSKELETAL: No deformities.   NEURO: Alert, moves all extremities, follows commands.  SKIN: Warm, dry, no rash visualized.      LAB RESULTS  Recent Results (from the past 24 hour(s))   CBC (No Diff)    Collection Time: 03/21/23  5:26 AM    Specimen: Blood   Result Value Ref Range    WBC 9.24 3.40 - 10.80 10*3/mm3    RBC 3.74 (L) 3.77 - 5.28 10*6/mm3    Hemoglobin 11.3 (L) 12.0 - 15.9 g/dL    Hematocrit 33.7 (L) 34.0 - 46.6 %    MCV 90.1 79.0 - 97.0 fL    MCH 30.2 26.6 - 33.0 pg    MCHC 33.5 31.5 - 35.7 g/dL    RDW 12.4 12.3 - 15.4 %    RDW-SD 40.2 37.0 - 54.0 fl    MPV 9.5 6.0 - 12.0 fL    Platelets 388 140 - 450 10*3/mm3   Comprehensive Metabolic Panel    Collection Time: 03/21/23  5:26 AM    Specimen: Blood   Result Value Ref Range    Glucose 91 65 - 99 mg/dL    BUN 9 6 - 20 mg/dL    Creatinine 0.64 0.57 - 1.00 mg/dL    Sodium 140  136 - 145 mmol/L    Potassium 3.9 3.5 - 5.2 mmol/L    Chloride 101 98 - 107 mmol/L    CO2 26.0 22.0 - 29.0 mmol/L    Calcium 8.7 8.6 - 10.5 mg/dL    Total Protein 6.3 6.0 - 8.5 g/dL    Albumin 3.9 3.5 - 5.2 g/dL    ALT (SGPT) 532 (H) 1 - 33 U/L    AST (SGOT) 260 (H) 1 - 32 U/L    Alkaline Phosphatase 108 39 - 117 U/L    Total Bilirubin 1.0 0.0 - 1.2 mg/dL    Globulin 2.4 gm/dL    A/G Ratio 1.6 g/dL    BUN/Creatinine Ratio 14.1 7.0 - 25.0    Anion Gap 13.0 5.0 - 15.0 mmol/L    eGFR 122.1 >60.0 mL/min/1.73   Magnesium    Collection Time: 03/21/23  5:26 AM    Specimen: Blood   Result Value Ref Range    Magnesium 1.9 1.6 - 2.6 mg/dL       If labs were ordered, I independently reviewed the results and considered them in treating the patient.        RADIOLOGY  FL ERCP pancreatic and biliary ducts    Result Date: 3/20/2023  FL ERCP PANCREATIC AND BILIARY DUCTS Date of Exam: 3/20/2023 11:02 AM EDT Indication: ERCP. Comparison: None available. Technique:  A series of radiographic digital spot films were obtained in conjunction with a endoscopic catheterization of the biliary and pancreatic ductal system, performed by the gastroenterologist. Fluoroscopic Time: 147.6 seconds Findings: Contrast injection common bile duct demonstrates abrupt cut off of the distal common bile duct suggesting a stone or multiple stones as demonstrated on prior MRCP. No other filling defects identified.     Impression: 1. Abrupt cut off of the distal common bile duct most likely related to underlying obstructing stones. Electronically Signed: Luis Enrique Noble  3/20/2023 1:58 PM EDT  Workstation ID: [id number]      I ordered and independently reviewed the above noted radiographic studies.      I viewed images of formal right upper quadrant ultrasound which shows inflamed gallbladder with gallstones and dilated common bile duct.  MRCP which shows choledocholithiasis with 9 mm stone in the common bile duct.  See radiologist's dictation for official  interpretation.        PROCEDURES    Procedures    ECG 12 Lead QT Measurement   Preliminary Result   Test Reason : QT Measurement   Blood Pressure :   */*   mmHG   Vent. Rate :  61 BPM     Atrial Rate :  61 BPM      P-R Int : 160 ms          QRS Dur :  94 ms       QT Int : 446 ms       P-R-T Axes :  29  -4  29 degrees      QTc Int : 448 ms      Normal sinus rhythm with sinus arrhythmia   Normal ECG   No previous ECGs available      Referred By:            Confirmed By:       ECG 12 Lead    (Results Pending)       MEDICATIONS GIVEN IN ER    Medications   Sodium Chloride (PF) 0.9 % 10 mL (10 mL Intravenous Given 3/19/23 2308)   lactated ringers infusion (125 mL/hr Intravenous New Bag 3/20/23 0156)   sodium chloride 0.9 % flush 10 mL ( Intravenous Canceled Entry 3/21/23 0900)   sodium chloride 0.9 % flush 10 mL (has no administration in time range)   sodium chloride 0.9 % infusion 40 mL (has no administration in time range)   ondansetron (ZOFRAN) injection 4 mg (has no administration in time range)   piperacillin-tazobactam (ZOSYN) 4.5 g in iso-osmotic dextrose 100 mL IVPB (premix) (4.5 g Intravenous New Bag 3/21/23 0814)   fenofibrate (TRICOR) tablet 145 mg (145 mg Oral Given 3/21/23 0813)   levothyroxine (SYNTHROID, LEVOTHROID) tablet 175 mcg (175 mcg Oral Given 3/21/23 0526)   famotidine (PEPCID) tablet 40 mg (40 mg Oral Given 3/21/23 0813)   morphine injection 2 mg (has no administration in time range)     And   naloxone (NARCAN) injection 0.4 mg (has no administration in time range)   Magnesium Low Dose Replacement - Initiate Nurse / BPA Driven Protocol (has no administration in time range)   lactated ringers infusion ( Intravenous Anesthesia Volume Adjustment 3/20/23 1211)   lactated ringers infusion (has no administration in time range)   magnesium sulfate 2g/50 mL (PREMIX) infusion (2 g Intravenous New Bag 3/21/23 0814)   lactated ringers bolus 1,000 mL (0 mL Intravenous Stopped 3/20/23 0140)   ketorolac  (TORADOL) injection 15 mg (15 mg Intravenous Given 3/19/23 2307)   ondansetron (ZOFRAN) injection 4 mg (4 mg Intravenous Given 3/19/23 2307)   morphine injection 2 mg (2 mg Intravenous Given 3/19/23 2307)   piperacillin-tazobactam (ZOSYN) 3.375 g in iso-osmotic dextrose 50 ml (premix) (0 g Intravenous Stopped 3/20/23 0213)         MEDICAL DECISION MAKING, PROGRESS, and CONSULTS    All labs have been independently reviewed by me.  All radiology studies have been reviewed by me and the radiologist dictating the report.  All EKG's have been independently viewed and interpreted by me.      Discussion below represents my analysis of pertinent findings related to patient's condition, differential diagnosis, treatment plan and final disposition.      Differential diagnosis:    Pancreatitis, cholecystitis, choledocholithiasis, ascending cholangitis, gastritis, peptic ulcer disease, enteritis, anemia, electrolyte abnormality      Additional sources:    - Discussed/ obtained information from independent historians: *    - External (non-ED) record review:  *Outpatient PCP notes for hypertension, hypothyroidism, asthma, endocrinology notes for postsurgical hypothyroidism    - Chronic or social conditions impacting care:     - Shared decision making:        Orders placed during this visit:  Orders Placed This Encounter   Procedures   • Blood Culture - Blood,   • Blood Culture - Blood,   • Urine Culture - Urine,   • US Gallbladder   • MRI abdomen wo contrast mrcp   • FL ERCP pancreatic and biliary ducts   • [place] Draw   • Comprehensive Metabolic Panel   • Lipase   • Urinalysis With Microscopic If Indicated (No Culture) - Urine, Clean Catch   • CBC Auto Differential   • Urinalysis, Microscopic Only - Urine, Clean Catch   • Pregnancy, Urine - Urine, Clean Catch   • Lactic Acid, Plasma   • Urinalysis With Microscopic If Indicated (No Culture) - Urine, Clean Catch   • Hepatitis Panel, Acute   • Urinalysis, Microscopic Only -  Urine, Clean Catch   • Procalcitonin   • CBC Auto Differential   • Comprehensive Metabolic Panel   • Magnesium   • Lipase   • TSH   • CBC (No Diff)   • Comprehensive Metabolic Panel   • Magnesium   • Magnesium   • NPO Diet NPO Type: Strict NPO   • Undress & Gown   • Vital Signs   • Intake & Output   • Weigh Patient   • Oral Care   • Place Sequential Compression Device   • Maintain Sequential Compression Device   • Cardiac Monitoring   • Up With Assistance   • Obtain Informed Consent   • May take Beta Blocker from home with sip of water.   • Implement Anesthesia Orders Day of Procedure   • Obtain Informed Consent   • Obtain Informed Consent   • Code Status and Medical Interventions:   • Inpatient General Surgery Consult   • Inpatient Gastroenterology Consult   • DIET MESSAGE Please send up late lunch tray. Patient did not get one.   • DIET MESSAGE Please send up late dinner tray. Patient did not get one.   • Oxygen Therapy- Nasal Cannula; Titrate for SPO2: 90% - 95%   • Incentive Spirometry   • POC Urine Pregnancy   • ECG 12 Lead QT Measurement   • ECG 12 Lead   • Insert Peripheral IV   • Insert Peripheral IV   • Insert Peripheral IV   • Inpatient Admission   • Initiate Observation Status   • ED Bed Request   • ERCP   • CBC & Differential   • Green Top (Gel)   • Lavender Top   • Gold Top - SST   • Gray Top   • Light Blue Top         Additional orders considered but not ordered:      ED Course:    Consultants:      ED Course as of 03/21/23 0936   Sun Mar 19, 2023   2221 In summary is a very nice 32-year-old female who presents the emergency department for evaluation of severe right upper quadrant pain that she believes is related to her gallbladder.  She been having the symptoms for a couple of weeks now.  She was seen at an outside hospital and initially told it was acid reflux but her pain persisted so her primary sent her for a gallbladder ultrasound which showed cholelithiasis without cholecystitis.  She was post  be scheduled for follow-up with the general surgeon but that has not happened yet and her pain continues to worsen.  She describes a severe colicky right upper quadrant pain that gets worse with eating.  She has been trying to manage her diet but has not been helping.  No appreciated fevers or systemic symptoms.  No other complaints at this time. [CC]      ED Course User Index  [CC] John Del Rio MD     She arrived awake and alert in obvious discomfort mildly hypertensive otherwise vitals within normal limits.  Pain control with IV fluids, Zofran, Toradol, morphine.  CBC reassuring and nonactionable no acute leukocytosis.  CMP concerning for significant transaminitis and hyperbilirubinemia have already ordered formal right upper quadrant ultrasound but adding on MRCP concern for biliary obstruction.  No elevation in lactate or lipase.  Hepatitis panel is negative.  Does have mildly elevated procalcitonin urinalysis concerning for possible urinary tract infection with white cells and nitrite but also positive for squamous cells have ordered repeat urinalysis.  MRCP shows choledocholithiasis with cholecystitis with 9 mm stone in the distal common bile duct that will need ERCP.  She is afebrile with normal mental status right now labs pointing away from ascending cholangitis but with the elevated procalcitonin and obvious obstruction going ahead and initiating Zosyn for antibiotic coverage and blood cultures have been sent.  Pain remains controlled upon reevaluation.  Hospitalist consulted for admission and GI evaluation.             AS OF 09:36 EDT VITALS:    BP - 147/78  HR - 83  TEMP - 98.4 °F (36.9 °C) (Oral)  O2 SATS - 94%                  DIAGNOSIS  Final diagnoses:   Choledocholithiasis with acute cholecystitis with obstruction   Essential hypertension   Epigastric pain   Transaminitis   Hyperbilirubinemia         DISPOSITION  Admit      Please note that portions of this document were completed with  voice recognition software.        John Del Rio MD  03/21/23 0950

## 2023-03-21 NOTE — ANESTHESIA PREPROCEDURE EVALUATION
Anesthesia Evaluation                  Airway   Mallampati: I  TM distance: >3 FB  Neck ROM: full  No difficulty expected  Dental      Pulmonary    (+) asthma,  Cardiovascular     ECG reviewed    (+) hypertension,       Neuro/Psych  GI/Hepatic/Renal/Endo    (+) obesity,   thyroid problem hypothyroidism    Musculoskeletal     Abdominal    Substance History      OB/GYN          Other                        Anesthesia Plan    ASA 2     general     intravenous induction     Anesthetic plan, risks, benefits, and alternatives have been provided, discussed and informed consent has been obtained with: patient.    Plan discussed with CRNA.        CODE STATUS:    Level Of Support Discussed With: Patient  Code Status (Patient has no pulse and is not breathing): CPR (Attempt to Resuscitate)  Medical Interventions (Patient has pulse or is breathing): Full Support

## 2023-03-21 NOTE — BRIEF OP NOTE
CHOLECYSTECTOMY LAPAROSCOPIC  Progress Note    Marcella Clearwater  3/21/2023    Pre-op Diagnosis:   Cholelithiasis       Post-Op Diagnosis Codes:     * Cholelithiasis with chronic cholecystitis [K80.10]    Procedure/CPT® Codes:        Procedure(s):  CHOLECYSTECTOMY LAPAROSCOPIC        Surgeon(s):  Sukhdeep Crook MD    Anesthesia: General    Staff:   Circulator: Martinez Brown RN  Scrub Person: Iwona Villa  Nursing Assistant: Jeninfer Reid         Estimated Blood Loss: minimal    Urine Voided: * No values recorded between 3/21/2023  4:03 PM and 3/21/2023  5:00 PM *    Specimens:                Specimens     ID Source Type Tests Collected By Collected At Frozen?    A Gallbladder Tissue · TISSUE PATHOLOGY EXAM   Sukhdeep Crook MD 3/21/23 5923     Description: Gallbladder    This specimen was not marked as sent.                Drains: * No LDAs found *    Findings: Marked chronic inflammation                    Dilated cystic duct        Complications: None          Sukhdeep Crook MD     Date: 3/21/2023  Time: 17:07 EDT

## 2023-03-21 NOTE — OP NOTE
Operative Note    Marcella Mary  [id number]   1990     Date of Surgery:  3/21/2023    Pre-Operative Diagnosis: Cholelithiasis with choledocholithiasis    Post-Operative Diagnosis: Chronic cholecystitis with cholelithiasis    Procedure: Laparoscopic cholecystectomy    Anesthesia:  General          Surgeon:  Sukhdeep Crook MD    Circulator: Martinez Brown RN  Scrub Person: Iwona Villa  Nursing Assistant: Jennifer Reid          Estimated Blood Loss: Very minimal    Findings: Markedly thick gallbladder wall consistent with chronic cholecystitis.  Dilated cystic duct    Complications: None      Indication for Procedure: Mrs Mary is a pleasant 32-year-old lady who presented with worsening abdominal pain and work-up remarkable for elevated liver function test, jaundice and choledocholithiasis.  She was hydrated and started on IV antibiotics.  She underwent an ERCP with sphincterotomy and extraction of 2 large stones.  The pancreatic duct stent was also placed.  She is doing much better at this time.  She is taken to the operating room for laparoscopic cholecystectomy, possible open.    Procedure: Patient was taken to the operating by anesthesia and placed supine on the table.  Following induction of general endotracheal anesthesia, SCDs were placed.  She received Zosyn IV.  The abdomen was then prepped and draped in a sterile fashion.  Timeout was observed.  Marcaine 0.5% was injected in the infraumbilical region and a small stab incision was made.  The fascia was incised under direct vision to enter the abdominal cavity.  The Aguirre introducer was advanced and the abdomen insufflated to 15 mmHg using CO2.  The 10 mm, 30 degree scope was advanced and the abdomen inspected.  No gross normalities were noted.  The patient was placed in reverse Trendelenburg position, right side up.  A 12 mm trocar was placed in the epigastric region and two 5 mm trocars were placed in the right upper quadrant under direct  vision.  The gallbladder was visualized.  The gallbladder wall was very thickened consistent with chronic inflammation.  It was grasped and pulled over the liver bed.  The infundibulum was pulled inferiorly and laterally.  The cystic duct was easily visualized.  It was distended and the junction of the common bile duct was noted.  The cystic duct was well isolated as well as cystic artery.  The duct was then clipped with multiple clips and transected.  I was concerned about the size of the duct and placed an Endoloop in the distal aspect of the duct as well and reinforced it with a Hemoclip applier.  The cystic artery was also well isolated, clipped and transected.  The gallbladder was removed off the liver bed with cautery.  Posterior arterial branch was also clipped.  The gallbladder was removed as a whole, placed in Endo Catch bag and delivered out of the abdomen through the umbilical port intact and sent to pathology.  Following adequate hemostasis of the liver bed with cautery, it was well irrigated with normal saline with clear return fluid noted.  The clips were intact with no evidence of bile leak or bleeding noted.  The trocars were then removed under direct vision with no bleeding noted.  The abdomen was deflated.  The umbilical fascia was approximated with multiple 0 Vicryl sutures and the skin incisions were approximated with 4-0 Monocryl subcuticular suture.  Steri-Strips and sterile dressing was applied.  The patient tolerated procedure well with no complications.  She was extubated and taken to the recovery room in a stable condition.  Sponge count needle count were correct at the end of the procedure.            Sukhdeep Crook MD  03/21/23  17:32 EDT

## 2023-03-21 NOTE — ANESTHESIA POSTPROCEDURE EVALUATION
Patient: Marcella Mary    Procedure Summary     Date: 03/21/23 Room / Location:  [facility] /  [facility]    Anesthesia Start: 1604 Anesthesia Stop: 1715    Procedure: CHOLECYSTECTOMY LAPAROSCOPIC (Abdomen) Diagnosis: Cholelithiasis with chronic cholecystitis    Surgeons: Sukhdeep Crook MD Provider: Mukund Diaz MD    Anesthesia Type: general ASA Status: 2          Anesthesia Type: general    Vitals  Vitals Value Taken Time   BP     Temp     Pulse 85 03/21/23 1715   Resp     SpO2 87 % 03/21/23 1715   Vitals shown include unvalidated device data.        Post Anesthesia Care and Evaluation    Patient location during evaluation: PACU  Patient participation: complete - patient participated  Level of consciousness: awake and alert  Pain score: 0  Pain management: adequate    Airway patency: patent  Anesthetic complications: No anesthetic complications  PONV Status: none  Cardiovascular status: hemodynamically stable and acceptable  Respiratory status: nonlabored ventilation, acceptable and nasal cannula  Hydration status: acceptable       24.9

## 2023-03-21 NOTE — ANESTHESIA PROCEDURE NOTES
Airway  Urgency: elective    Date/Time: 3/21/2023 4:11 PM  Airway not difficult    General Information and Staff    Patient location during procedure: OR  SRNA: Marcella Lara SRNA  Indications and Patient Condition  Indications for airway management: airway protection    Preoxygenated: yes  MILS not maintained throughout  Mask difficulty assessment: 1 - vent by mask    Final Airway Details  Final airway type: endotracheal airway      Successful airway: ETT  Cuffed: yes   Successful intubation technique: direct laryngoscopy  Endotracheal tube insertion site: oral  Blade: Nadia  Blade size: 3  ETT size (mm): 7.0  Cormack-Lehane Classification: grade IIa - partial view of glottis  Placement verified by: chest auscultation and capnometry   Measured from: lips  ETT/EBT  to lips (cm): 21  Number of attempts at approach: 1  Assessment: lips, teeth, and gum same as pre-op and atraumatic intubation    Additional Comments  Negative epigastric sounds, Breath sound equal bilaterally with symmetric chest rise and fall

## 2023-03-21 NOTE — PLAN OF CARE
Goal Outcome Evaluation:  Plan of Care Reviewed With: patient        Progress: improving  Outcome Evaluation: VSS on RA. No complaints of pain or nausea. IVF and IV abx infusing. Tolerated clear liquid diet. NPO since 0000. Patient slept throughout the night. Spouse at bedside. Plan for marlena with Dr. LINARES today. Will continue with plan of care.

## 2023-03-22 ENCOUNTER — TELEPHONE (OUTPATIENT)
Dept: GASTROENTEROLOGY | Facility: CLINIC | Age: 33
End: 2023-03-22
Payer: COMMERCIAL

## 2023-03-22 VITALS
WEIGHT: 245 LBS | BODY MASS INDEX: 40.82 KG/M2 | HEIGHT: 65 IN | RESPIRATION RATE: 18 BRPM | OXYGEN SATURATION: 94 % | DIASTOLIC BLOOD PRESSURE: 74 MMHG | HEART RATE: 85 BPM | TEMPERATURE: 98.7 F | SYSTOLIC BLOOD PRESSURE: 119 MMHG

## 2023-03-22 LAB
ALBUMIN SERPL-MCNC: 4.1 G/DL (ref 3.5–5.2)
ALBUMIN/GLOB SERPL: 1.6 G/DL
ALP SERPL-CCNC: 95 U/L (ref 39–117)
ALT SERPL W P-5'-P-CCNC: 372 U/L (ref 1–33)
ANION GAP SERPL CALCULATED.3IONS-SCNC: 9 MMOL/L (ref 5–15)
AST SERPL-CCNC: 151 U/L (ref 1–32)
BILIRUB SERPL-MCNC: 0.7 MG/DL (ref 0–1.2)
BUN SERPL-MCNC: 19 MG/DL (ref 6–20)
BUN/CREAT SERPL: 24.1 (ref 7–25)
CALCIUM SPEC-SCNC: 8.8 MG/DL (ref 8.6–10.5)
CHLORIDE SERPL-SCNC: 100 MMOL/L (ref 98–107)
CO2 SERPL-SCNC: 29 MMOL/L (ref 22–29)
CREAT SERPL-MCNC: 0.79 MG/DL (ref 0.57–1)
DEPRECATED RDW RBC AUTO: 42.4 FL (ref 37–54)
EGFRCR SERPLBLD CKD-EPI 2021: 103.3 ML/MIN/1.73
ERYTHROCYTE [DISTWIDTH] IN BLOOD BY AUTOMATED COUNT: 12.5 % (ref 12.3–15.4)
GLOBULIN UR ELPH-MCNC: 2.6 GM/DL
GLUCOSE SERPL-MCNC: 123 MG/DL (ref 65–99)
HCT VFR BLD AUTO: 34.1 % (ref 34–46.6)
HGB BLD-MCNC: 11.1 G/DL (ref 12–15.9)
MAGNESIUM SERPL-MCNC: 2.3 MG/DL (ref 1.6–2.6)
MCH RBC QN AUTO: 30.1 PG (ref 26.6–33)
MCHC RBC AUTO-ENTMCNC: 32.6 G/DL (ref 31.5–35.7)
MCV RBC AUTO: 92.4 FL (ref 79–97)
PLATELET # BLD AUTO: 452 10*3/MM3 (ref 140–450)
PMV BLD AUTO: 9.5 FL (ref 6–12)
POTASSIUM SERPL-SCNC: 3.8 MMOL/L (ref 3.5–5.2)
PROT SERPL-MCNC: 6.7 G/DL (ref 6–8.5)
RBC # BLD AUTO: 3.69 10*6/MM3 (ref 3.77–5.28)
SODIUM SERPL-SCNC: 138 MMOL/L (ref 136–145)
WBC NRBC COR # BLD: 12 10*3/MM3 (ref 3.4–10.8)

## 2023-03-22 PROCEDURE — 99239 HOSP IP/OBS DSCHRG MGMT >30: CPT | Performed by: NURSE PRACTITIONER

## 2023-03-22 PROCEDURE — 85027 COMPLETE CBC AUTOMATED: CPT | Performed by: SURGERY

## 2023-03-22 PROCEDURE — 25010000002 PIPERACILLIN SOD-TAZOBACTAM PER 1 G: Performed by: SURGERY

## 2023-03-22 PROCEDURE — 80053 COMPREHEN METABOLIC PANEL: CPT | Performed by: SURGERY

## 2023-03-22 PROCEDURE — G0378 HOSPITAL OBSERVATION PER HR: HCPCS

## 2023-03-22 PROCEDURE — 94799 UNLISTED PULMONARY SVC/PX: CPT

## 2023-03-22 PROCEDURE — 83735 ASSAY OF MAGNESIUM: CPT | Performed by: SURGERY

## 2023-03-22 RX ORDER — AMOXICILLIN AND CLAVULANATE POTASSIUM 875; 125 MG/1; MG/1
1 TABLET, FILM COATED ORAL 2 TIMES DAILY
Qty: 10 TABLET | Refills: 0 | Status: SHIPPED | OUTPATIENT
Start: 2023-03-22 | End: 2023-03-27

## 2023-03-22 RX ORDER — ACETAMINOPHEN 500 MG
500 TABLET ORAL 4 TIMES DAILY PRN
Start: 2023-03-22

## 2023-03-22 RX ORDER — HYDROCODONE BITARTRATE AND ACETAMINOPHEN 5; 325 MG/1; MG/1
1 TABLET ORAL EVERY 6 HOURS PRN
Qty: 6 TABLET | Refills: 0 | Status: SHIPPED | OUTPATIENT
Start: 2023-03-22 | End: 2023-03-25

## 2023-03-22 RX ORDER — AMOXICILLIN AND CLAVULANATE POTASSIUM 875; 125 MG/1; MG/1
1 TABLET, FILM COATED ORAL 2 TIMES DAILY
Qty: 8 TABLET | Refills: 0 | Status: SHIPPED | OUTPATIENT
Start: 2023-03-22 | End: 2023-03-26

## 2023-03-22 RX ORDER — HYDROCODONE BITARTRATE AND ACETAMINOPHEN 5; 325 MG/1; MG/1
1 TABLET ORAL EVERY 6 HOURS PRN
Qty: 6 TABLET | Refills: 0 | Status: SHIPPED | OUTPATIENT
Start: 2023-03-22 | End: 2023-03-22 | Stop reason: SDUPTHER

## 2023-03-22 RX ADMIN — ALBUTEROL SULFATE 2.5 MG: 2.5 SOLUTION RESPIRATORY (INHALATION) at 08:03

## 2023-03-22 RX ADMIN — FENOFIBRATE 145 MG: 145 TABLET ORAL at 08:04

## 2023-03-22 RX ADMIN — TAZOBACTAM SODIUM AND PIPERACILLIN SODIUM 4.5 G: 500; 4 INJECTION, SOLUTION INTRAVENOUS at 08:04

## 2023-03-22 RX ADMIN — TAZOBACTAM SODIUM AND PIPERACILLIN SODIUM 4.5 G: 500; 4 INJECTION, SOLUTION INTRAVENOUS at 00:22

## 2023-03-22 RX ADMIN — FAMOTIDINE 40 MG: 20 TABLET ORAL at 08:04

## 2023-03-22 RX ADMIN — LEVOTHYROXINE SODIUM 175 MCG: 175 TABLET ORAL at 05:18

## 2023-03-22 RX ADMIN — Medication 10 ML: at 08:04

## 2023-03-22 NOTE — DISCHARGE SUMMARY
[facility] Medicine Services  DISCHARGE SUMMARY    Patient Name: Marcella Mary  : 1990  MRN: [id number]    Date of Admission: 3/19/2023 10:25 PM  Date of Discharge:  3/22/2023  Primary Care Physician: Reina Hernandez MD    Consults     Date and Time Order Name Status Description    3/20/2023  2:45 AM Inpatient Gastroenterology Consult Completed     3/20/2023  2:45 AM Inpatient General Surgery Consult Completed           Hospital Course     Presenting Problem:   Choledocholithiasis with acute cholecystitis with obstruction [K80.43]  Cholecystitis [K81.9]    Active Hospital Problems    Diagnosis  POA   • **Choledocholithiasis with acute cholecystitis with obstruction [K80.43]  Yes   • Essential hypertension [I10]  Yes   • Hypothyroidism [E03.9]  Yes   • Cholecystitis [K81.9]  Yes      Resolved Hospital Problems   No resolved problems to display.          Hospital Course:  Marcella Mary is a 32 y.o. female w/ hx asthma, hypothyroidism, hld who presented w/ abd pain. Labs revealed elevated transaminases and bilirubin. Ct imaging revealed gb wall thickening. mrcp revealed evidence of cholecystitis and 9mm distal common bile duct stone. Patient was admitted and initiated on empiric zosyn.  GI and general surgery consulted. Underwent ercp w/ stone extraction 3/20/23. Dr. LINARES taking to OR for ccy 3/21/23        Acute cholecystitis  Choledocholithiasis w/ elevated bilirubin and transaminases  -3/20/23: s/p ercp w/ sphincterotomy, balloon sweeps yielded large stone and 2 smaller stones, sludge noted  -3/21/23 OR for ccy with Dr. LINARES; completed antibiotics with augmentin at [facility]   Dyslipidemia   -on tricor  Hypothyroidism   -synthroid  Hx asthma  -prn albuterol      Discharge Follow Up Recommendations for outpatient labs/diagnostics:  PCP follow up PRN  Follow up with Dr. LINARES 1 week    Day of Discharge     HPI:   Mild abdominal soreness with movement. Otherwise no pain, nausea. Tolerated  breakfast    Review of Systems  Gen- No fevers, chills  CV- No chest pain, palpitations  Resp- No cough, dyspnea  GI- No N/V/D, abd pain        Vital Signs:   Temp:  [98.1 °F (36.7 °C)-100.1 °F (37.8 °C)] 98.7 °F (37.1 °C)  Heart Rate:  [61-94] 94  Resp:  [12-18] 18  BP: (111-158)/(74-97) 119/74  Flow (L/min):  [2-4] 2      Physical Exam:  Constitutional: No acute distress, awake, alert  HENT: NCAT, mucous membranes moist  Respiratory: Clear to auscultation bilaterally, respiratory effort normal   Cardiovascular: RRR, no murmurs, rubs, or gallops  Gastrointestinal: Positive bowel sounds, soft, approp tender. Lap incision CDI  Musculoskeletal: No bilateral ankle edema  Psychiatric: Appropriate affect, cooperative  Neurologic: Oriented x 3, strength symmetric in all extremities, Cranial Nerves grossly intact to confrontation, speech clear  Skin: No rashes      Pertinent  and/or Most Recent Results     LAB RESULTS:      Lab 03/22/23 0833 03/21/23 0526 03/20/23  0654 03/19/23  2154   WBC 12.00* 9.24 6.31 7.06   HEMOGLOBIN 11.1* 11.3* 11.5* 12.5   HEMATOCRIT 34.1 33.7* 34.4 37.3   PLATELETS 452* 388 360 405   NEUTROS ABS  --   --  3.84 5.23   IMMATURE GRANS (ABS)  --   --  0.02 0.02   LYMPHS ABS  --   --  1.94 1.19   MONOS ABS  --   --  0.39 0.46   EOS ABS  --   --  0.08 0.09   MCV 92.4 90.1 89.8 88.8   PROCALCITONIN  --   --   --  0.37*   LACTATE  --   --   --  0.7         Lab 03/22/23  0833 03/21/23  0526 03/20/23  0654 03/19/23  2154   SODIUM 138 140 139 140   POTASSIUM 3.8 3.9 3.8 3.7   CHLORIDE 100 101 103 102   CO2 29.0 26.0 28.0 24.0   ANION GAP 9.0 13.0 8.0 14.0   BUN 19 9 9 10   CREATININE 0.79 0.64 0.61 0.62   EGFR 103.3 122.1 123.5 123.0   GLUCOSE 123* 91 79 110*   CALCIUM 8.8 8.7 8.8 9.1   MAGNESIUM 2.3 1.9 2.0  --    TSH  --   --   --  12.110*         Lab 03/22/23  0833 03/21/23  0526 03/20/23  0654 03/19/23  2154   TOTAL PROTEIN 6.7 6.3 6.3 7.7   ALBUMIN 4.1 3.9 3.8 4.4   GLOBULIN 2.6 2.4 2.5 3.3   ALT  (SGPT) 372* 532* 612* 623*   AST (SGOT) 151* 260* 590* 724*   BILIRUBIN 0.7 1.0 2.7* 2.7*   ALK PHOS 95 108 106 113   LIPASE  --   --  22 20                     Brief Urine Lab Results  (Last result in the past 365 days)      Color   Clarity   Blood   Leuk Est   Nitrite   Protein   CREAT   Urine HCG        03/21/23 1315               Negative           Microbiology Results (last 10 days)     Procedure Component Value - Date/Time    Urine Culture - Urine, Urine, Clean Catch [[id number]] Collected: 03/19/23 2326    Lab Status: Final result Specimen: Urine, Clean Catch Updated: 03/21/23 0946     Urine Culture 25,000 CFU/mL Normal Urogenital Dena    Narrative:      Colonization of the urinary tract without infection is common. Treatment is discouraged unless the patient is symptomatic, pregnant, or undergoing an invasive urologic procedure.    Blood Culture - Blood, Arm, Left [[id number]]  (Normal) Collected: 03/19/23 2240    Lab Status: Preliminary result Specimen: Blood from Arm, Left Updated: 03/21/23 2315     Blood Culture No growth at 2 days    Blood Culture - Blood, Arm, Right [[id number]]  (Normal) Collected: 03/19/23 2235    Lab Status: Preliminary result Specimen: Blood from Arm, Right Updated: 03/21/23 2315     Blood Culture No growth at 2 days          FL ERCP pancreatic and biliary ducts    Result Date: 3/20/2023  FL ERCP PANCREATIC AND BILIARY DUCTS Date of Exam: 3/20/2023 11:02 AM EDT Indication: ERCP. Comparison: None available. Technique:  A series of radiographic digital spot films were obtained in conjunction with a endoscopic catheterization of the biliary and pancreatic ductal system, performed by the gastroenterologist. Fluoroscopic Time: 147.6 seconds Findings: Contrast injection common bile duct demonstrates abrupt cut off of the distal common bile duct suggesting a stone or multiple stones as demonstrated on prior MRCP. No other filling defects identified.     Impression: 1. Abrupt cut off of the  distal common bile duct most likely related to underlying obstructing stones. Electronically Signed: Luis Enrique Noble  3/20/2023 1:58 PM EDT  Workstation ID: [id number]    US Gallbladder    Result Date: 3/20/2023  EXAMINATION: LIMITED ABDOMINAL ULTRASOUND  DATE OF EXAMINATION: 3/20/2023. COMPARISON: None available. INDICATION: Severe abdominal pain. TECHNIQUE: Grey scale sonographic images of the abdomen were performed. FINDINGS: Pancreas: The visualized pancreas is within normal limits. Liver: The liver is diffusely heterogeneous and increased in echogenicity compatible with fatty liver infiltration.. The common bile duct measures 12 mm.  Gallbladder: The gallbladder wall appears to be heterogeneous and thickened measuring up to 9 mm in diameter. There is a shadowing gallstone within the gallbladder as this raises the suspicion of cholecystitis. Sonographic Jiang sign was also noted to be positive. Right Kidney: The right kidney measures 10.4 x 5.2 x 6.5 cm.  There is no hydronephrosis, shadowing stone, or definite mass.  Additional significant findings:  None.      1.  Cholelithiasis with positive sonographic Jiang sign and thickening of the gallbladder wall would raise the suspicion of cholecystitis. 2.  Dilation of the common bile duct which is incompletely evaluated on this examination. 3.  Diffuse hepatic steatosis. 4. No evidence of right-sided hydronephrosis.  Electronically signed by:  Dale Allen D.O.  3/19/2023 11:47 PM Mountain Time    MRI abdomen wo contrast mrcp    Result Date: 3/20/2023  EXAMINATION: MRI ABDOMEN WO CONTRAST MRCP DATE: 3/19/2023 11:34 PM INDICATION:  RUQ pain, cholelithiasis, acute transaminitis/hyperbilirubinemia TECHNIQUE: Multiplanar multisequence MRI of the abdomen was performed without and with  the intravenous administration of contrast.  3D MRCP with MIP reconstructions.  Contrast: None. COMPARISON: None Prior. FINDINGS: LOWER CHEST: Unremarkable. LIVER: Signal loss on out of  phase imaging suggests fatty liver. No mass lesion identified. Portal and hepatic veins appear patent. BILE DUCTS: Mild intrahepatic biliary dilatation. Moderate extrahepatic biliary dilatation, common bile duct measuring up to 1.4 cm.  Few common bile duct stones, largest 9 mm stone in the distal common bile duct (series 2/image 26) GALLBLADDER: Marked gallbladder wall thickening to 1.1 cm. Sludge and stones in the gallbladder. Evidence of stones in the cystic duct. SPLEEN: Normal. PANCREAS: Mildly prominent main pancreatic duct 3 mm. ADRENALS: Normal. KIDNEYS: No hydronephrosis. NODES: No adenopathy. BOWEL: Normal.  ASCITES:  None.     1.  Evidence of cholecystitis. 2.  Cholelithiasis, probable sludge. 3.  Choledocholithiasis, largest 9 mm stone in the distal common bile duct. 4.  Evidence of fatty liver Electronically signed by:  Kristine Jimenez M.D.  3/19/2023 10:20 PM Mountain Time                  Plan for Follow-up of Pending Labs/Results:   Pending Labs     Order Current Status    Tissue Pathology Exam In process    Blood Culture - Blood, Arm, Left Preliminary result    Blood Culture - Blood, Arm, Right Preliminary result        Discharge Details        Discharge Medications      New Medications      Instructions Start Date   acetaminophen 500 MG tablet  Commonly known as: TYLENOL   500 mg, Oral, 4 Times Daily PRN      amoxicillin-clavulanate 875-125 MG per tablet  Commonly known as: Augmentin   1 tablet, Oral, 2 Times Daily         Continue These Medications      Instructions Start Date   famotidine 40 MG tablet  Commonly known as: PEPCID   40 mg, Oral, Daily      fenofibrate 145 MG tablet  Commonly known as: TRICOR   145 mg, Oral, Daily      levothyroxine 175 MCG tablet  Commonly known as: SYNTHROID, LEVOTHROID   175 mcg, Oral, Daily             Allergies   Allergen Reactions   • Ceclor [Cefaclor] Rash     Pt was told by mother - as a baby rx gave her a rash         Discharge Disposition:  Home or Self  Care    Diet:  Hospital:  Diet Order   Procedures   • Diet: Regular/House Diet; Texture: Soft to Chew (NDD 3); Soft to Chew: Chopped Meat; Fluid Consistency: Thin (IDDSI 0)       Activity:  Activity Instructions     Other Activity Instructions      Activity Instructions: no lifting >10-15lb, no tub bath. Ok to shower          Restrictions or Other Recommendations:         CODE STATUS:    Code Status and Medical Interventions:   Ordered at: 03/20/23 0129     Level Of Support Discussed With:    Patient     Code Status (Patient has no pulse and is not breathing):    CPR (Attempt to Resuscitate)     Medical Interventions (Patient has pulse or is breathing):    Full Support       No future appointments.    Additional Instructions for the Follow-ups that You Need to Schedule     Discharge Follow-up with PCP   As directed       Currently Documented PCP:    Reina Hernandez MD    PCP Phone Number:    [phone]     Follow Up Details: prn         Discharge Follow-up with Specified Provider: Dr. LINARES; 1 Week   As directed      To: Dr. LINARES    Follow Up: 1 Week                     GENE Sauceda  03/22/23      Time Spent on Discharge:  I spent  40  minutes on this discharge activity which included: face-to-face encounter with the patient, reviewing the data in the system, coordination of the care with the nursing staff as well as consultants, documentation, and entering orders.        Electronically signed by GENE Sauceda, 03/22/23, 10:40 AM EDT.

## 2023-03-22 NOTE — CASE MANAGEMENT/SOCIAL WORK
Continued Stay Note   [facility]     Patient Name: Marcella Mary  MRN: [id number]  Today's Date: 3/22/2023    Admit Date: 3/19/2023    Plan: Home at discharge   Discharge Plan     Row Name 03/22/23 1557       Plan    Plan Home at discharge    Final Discharge Disposition Code 01 - home or self-care    Final Note Patient has been discharged home. She returned home with her spouse. No new DME or discharge needs identified- [place] [phone]               Discharge Codes    No documentation.               Expected Discharge Date and Time     Expected Discharge Date Expected Discharge Time    Mar 22, 2023             [place] Rogers RN

## 2023-03-22 NOTE — PROGRESS NOTES
"Marcella [place]  1990  [id number]    Surgery Progress Note    Date of visit: 3/22/2023    Subjective: Feels better  Complains of abdominal soreness  Tolerating diet  Voiding well    Objective:    /74 (BP Location: Right arm, Patient Position: Lying)   Pulse 71   Temp 98.7 °F (37.1 °C) (Oral)   Resp 18   Ht 165.1 cm (65\")   Wt 111 kg (245 lb)   LMP 03/10/2023 (Approximate)   SpO2 94%   BMI 40.77 kg/m²     Intake/Output Summary (Last 24 hours) at 3/22/2023 0831  Last data filed at 3/21/2023 1942  Gross per 24 hour   Intake 1500 ml   Output 5 ml   Net 1495 ml       CV: Regular rate and rhythm  L: normal air entry  Abd: Soft with minimal puncture site tenderness      LABS:    Results from last 7 days   Lab Units 03/21/23  0526   WBC 10*3/mm3 9.24   HEMOGLOBIN g/dL 11.3*   HEMATOCRIT % 33.7*   PLATELETS 10*3/mm3 388     Results from last 7 days   Lab Units 03/21/23  0526   SODIUM mmol/L 140   POTASSIUM mmol/L 3.9   CHLORIDE mmol/L 101   CO2 mmol/L 26.0   BUN mg/dL 9   CREATININE mg/dL 0.64   CALCIUM mg/dL 8.7   BILIRUBIN mg/dL 1.0   ALK PHOS U/L 108   ALT (SGPT) U/L 532*   AST (SGOT) U/L 260*   GLUCOSE mg/dL 91     Results from last 7 days   Lab Units 03/21/23  0526   SODIUM mmol/L 140   POTASSIUM mmol/L 3.9   CHLORIDE mmol/L 101   CO2 mmol/L 26.0   BUN mg/dL 9   CREATININE mg/dL 0.64   GLUCOSE mg/dL 91   CALCIUM mg/dL 8.7     Lab Results   Lab Value Date/Time    LIPASE 22 03/20/2023 0654    LIPASE 20 03/19/2023 2154         Assessment/ Plan: Stable course status post laparoscopic cholecystectomy  Patient underwent an ERCP with stone extraction secondary to choledocholithiasis  She is progressing well  Labs are still pending  Hopefully home later today  Follow-up in the office in 1 week    Problem List Items Addressed This Visit        Cardiac and Vasculature    Essential hypertension    Relevant Orders    Case Request (Completed)       Endocrine and Metabolic    Hypothyroidism    Relevant Medications    " levothyroxine (SYNTHROID, LEVOTHROID) 175 MCG tablet    Other Relevant Orders    Case Request (Completed)       Gastrointestinal Abdominal     * (Principal) Choledocholithiasis with acute cholecystitis with obstruction - Primary    Relevant Orders    Case Request (Completed)    Cholecystitis    Relevant Orders    Case Request (Completed)   Other Visit Diagnoses     Epigastric pain        Transaminitis        Hyperbilirubinemia        Choledocholithiasis        Relevant Orders    Tissue Pathology Exam            Sukhdeep Crook MD  3/22/2023  08:31 EDT

## 2023-03-22 NOTE — TELEPHONE ENCOUNTER
Orders from Dr. Brunner to have patient return in one week (by 03/28) to have abdomen KUB performed to check for spontaneous passage of pancreatic duct stent.    If KUB shows stent in place, will need EGD performed.    Could not reach patient or patient's emergency contact. LVM for both numbers. Will try again.

## 2023-03-22 NOTE — PLAN OF CARE
Goal Outcome Evaluation:  Plan of Care Reviewed With: patient        Progress: improving  Outcome Evaluation: VSS on RA. No complaints of pain or nausea. Voiding and ambulating in the hallways appropriately. Tolerating GI soft diet. Patient slept throughout the night. Lap sites CDI. IVF and IV abx infusing. Plan for possible D/C today. Will continue with plan of care.

## 2023-03-23 LAB
CYTO UR: NORMAL
LAB AP CASE REPORT: NORMAL
LAB AP CLINICAL INFORMATION: NORMAL
PATH REPORT.FINAL DX SPEC: NORMAL
PATH REPORT.GROSS SPEC: NORMAL

## 2023-03-24 LAB
BACTERIA SPEC AEROBE CULT: NORMAL
BACTERIA SPEC AEROBE CULT: NORMAL

## 2023-03-27 ENCOUNTER — HOSPITAL ENCOUNTER (OUTPATIENT)
Dept: GENERAL RADIOLOGY | Facility: HOSPITAL | Age: 33
Discharge: HOME | End: 2023-03-27
Admitting: NURSE PRACTITIONER
Payer: COMMERCIAL

## 2023-03-27 DIAGNOSIS — K80.43 CHOLEDOCHOLITHIASIS WITH ACUTE CHOLECYSTITIS WITH OBSTRUCTION: Primary | ICD-10-CM

## 2023-03-27 DIAGNOSIS — K80.43 CHOLEDOCHOLITHIASIS WITH ACUTE CHOLECYSTITIS WITH OBSTRUCTION: ICD-10-CM

## 2023-03-27 PROCEDURE — 74018 RADEX ABDOMEN 1 VIEW: CPT

## 2023-03-29 LAB
QT INTERVAL: 446 MS
QTC INTERVAL: 448 MS

## 2024-08-26 ENCOUNTER — TELEPHONE (OUTPATIENT)
Dept: ENDOCRINOLOGY | Facility: CLINIC | Age: 34
End: 2024-08-26
Payer: COMMERCIAL

## 2024-08-26 NOTE — TELEPHONE ENCOUNTER
The [facility] received a fax that requires your attention. The document has been indexed to the patient’s chart for your review.      Reason for sending: LAB RESULTS    Documents Description: LAB RESULTS    Name of Sender: [place] [facility]    Date Indexed: 08/26/24    Notes (if needed):

## (undated) DEVICE — LAPAROVUE VISIBILITY SYSTEM LAPAROSCOPIC SOLUTIONS: Brand: LAPAROVUE

## (undated) DEVICE — FIRST STEP BEDSIDE ADD WATER KIT - RESEALABLE STAND-UP POUCH, ENDOSCOPIC CLEANING PAD - 1 POUCH: Brand: FIRST STEP BEDSIDE ADD WATER KIT - RESEALABLE STAND-UP POUCH, ENDOSCOPIC CLEANIN

## (undated) DEVICE — THE BITE BLOCK MAXI, LATEX FREE STRAP IS USED TO PROTECT THE ENDOSCOPE INSERTION TUBE FROM BEING BITTEN BY THE PATIENT.

## (undated) DEVICE — APPL CHLORAPREP W/TINT 26ML BLU

## (undated) DEVICE — PK LAP LASR CHOLE 10

## (undated) DEVICE — GLV SURG SENSICARE PI ORTHO SZ7.5 LF STRL

## (undated) DEVICE — ENDOCUT SCISSOR TIP, DISPOSABLE: Brand: RENEW

## (undated) DEVICE — SPHINCTEROTOME: Brand: DREAMTOME™ RX 44

## (undated) DEVICE — DEV LK WIREGUIDE FUSN OLYMP SCP

## (undated) DEVICE — SOL IRR H2O BTL 1000ML STRL

## (undated) DEVICE — SOLIDIFIER LIQ PREMISORB 1500CC

## (undated) DEVICE — TUBING, SUCTION, 1/4" X 10', STRAIGHT: Brand: MEDLINE

## (undated) DEVICE — TISSUE RETRIEVAL SYSTEM: Brand: INZII RETRIEVAL SYSTEM

## (undated) DEVICE — LUBE JELLY FOIL PACK 1.4 OZ: Brand: MEDLINE INDUSTRIES, INC.

## (undated) DEVICE — SYR LUERLOK 50ML

## (undated) DEVICE — CONTN GRAD MEAS TRIANG 32OZ BLK

## (undated) DEVICE — INTRO ACCSR BLNT TP

## (undated) DEVICE — CANNULATING SPHINCTEROTOME: Brand: JAGTOME™ REVOLUTION RX

## (undated) DEVICE — KT ORCA ORCAPOD DISP STRL

## (undated) DEVICE — ENDOPATH XCEL BLADELESS TROCARS WITH STABILITY SLEEVES: Brand: ENDOPATH XCEL

## (undated) DEVICE — SPNG ENDO BEDSIDE TUB ENZYM

## (undated) DEVICE — ENDOPATH XCEL BLUNT TIP TROCARS WITH SMOOTH SLEEVES: Brand: ENDOPATH XCEL

## (undated) DEVICE — SAFELINER SUCTION CANISTER 1000CC: Brand: DEROYAL

## (undated) DEVICE — [HIGH FLOW INSUFFLATOR,  DO NOT USE IF PACKAGE IS DAMAGED,  KEEP DRY,  KEEP AWAY FROM SUNLIGHT,  PROTECT FROM HEAT AND RADIOACTIVE SOURCES.]: Brand: PNEUMOSURE

## (undated) DEVICE — BOWL PLSTC MD 16OZ BLU STRL

## (undated) DEVICE — SYR LL TP 10ML STRL

## (undated) DEVICE — ENDOPATH XCEL UNIVERSAL TROCAR STABLILITY SLEEVES: Brand: ENDOPATH XCEL

## (undated) DEVICE — HYBRID CO2 TUBING/CAP SET FOR OLYMPUS® SCOPES & CO2 SOURCE: Brand: ERBE

## (undated) DEVICE — SUT MNCRYL PLS ANTIB UD 4/0 PS2 18IN

## (undated) DEVICE — RETRIEVAL BALLOON CATHETER: Brand: EXTRACTOR™ PRO RX

## (undated) DEVICE — ANTIBACTERIAL UNDYED BRAIDED (POLYGLACTIN 910), SYNTHETIC ABSORBABLE SURGICAL SUTURE: Brand: COATED VICRYL

## (undated) DEVICE — LAPAROSCOPIC SMOKE FILTRATION SYSTEM: Brand: PALL LAPAROSHIELD® PLUS LAPAROSCOPIC SMOKE FILTRATION SYSTEM

## (undated) DEVICE — PDS II VLT 0 107CM AG ST3: Brand: ENDOLOOP